# Patient Record
Sex: MALE | Race: WHITE | NOT HISPANIC OR LATINO | Employment: OTHER | URBAN - METROPOLITAN AREA
[De-identification: names, ages, dates, MRNs, and addresses within clinical notes are randomized per-mention and may not be internally consistent; named-entity substitution may affect disease eponyms.]

---

## 2017-02-22 ENCOUNTER — APPOINTMENT (OUTPATIENT)
Dept: LAB | Facility: HOSPITAL | Age: 57
End: 2017-02-22

## 2017-02-22 ENCOUNTER — OFFICE VISIT (OUTPATIENT)
Dept: FAMILY MEDICINE CLINIC | Facility: CLINIC | Age: 57
End: 2017-02-22

## 2017-02-22 VITALS
BODY MASS INDEX: 47.56 KG/M2 | WEIGHT: 303 LBS | TEMPERATURE: 98.9 F | DIASTOLIC BLOOD PRESSURE: 90 MMHG | SYSTOLIC BLOOD PRESSURE: 130 MMHG | OXYGEN SATURATION: 98 % | HEIGHT: 67 IN | HEART RATE: 98 BPM

## 2017-02-22 DIAGNOSIS — I10 ESSENTIAL HYPERTENSION: ICD-10-CM

## 2017-02-22 DIAGNOSIS — Z12.5 SPECIAL SCREENING FOR MALIGNANT NEOPLASM OF PROSTATE: ICD-10-CM

## 2017-02-22 DIAGNOSIS — E03.9 HYPOTHYROIDISM, UNSPECIFIED TYPE: ICD-10-CM

## 2017-02-22 DIAGNOSIS — E11.9 TYPE 2 DIABETES MELLITUS WITHOUT COMPLICATION, WITHOUT LONG-TERM CURRENT USE OF INSULIN (HCC): Primary | ICD-10-CM

## 2017-02-22 LAB
ALBUMIN SERPL-MCNC: 4.9 G/DL (ref 3.2–4.8)
ALBUMIN/GLOB SERPL: 1.6 G/DL (ref 1.5–2.5)
ALP SERPL-CCNC: 67 U/L (ref 25–100)
ALT SERPL W P-5'-P-CCNC: 64 U/L (ref 7–40)
ANION GAP SERPL CALCULATED.3IONS-SCNC: 11 MMOL/L (ref 3–11)
ARTICHOKE IGE QN: 130 MG/DL (ref 0–130)
AST SERPL-CCNC: 36 U/L (ref 0–33)
BILIRUB SERPL-MCNC: 0.3 MG/DL (ref 0.3–1.2)
BUN BLD-MCNC: 9 MG/DL (ref 9–23)
BUN/CREAT SERPL: 11.3 (ref 7–25)
CALCIUM SPEC-SCNC: 10.1 MG/DL (ref 8.7–10.4)
CHLORIDE SERPL-SCNC: 102 MMOL/L (ref 99–109)
CHOLEST SERPL-MCNC: 202 MG/DL (ref 0–200)
CO2 SERPL-SCNC: 26 MMOL/L (ref 20–31)
CREAT BLD-MCNC: 0.8 MG/DL (ref 0.6–1.3)
GFR SERPL CREATININE-BSD FRML MDRD: 100 ML/MIN/1.73
GLOBULIN UR ELPH-MCNC: 3 GM/DL
GLUCOSE BLD-MCNC: 110 MG/DL (ref 70–100)
HBA1C MFR BLD: 6.9 % (ref 4.8–5.6)
HDLC SERPL-MCNC: 45 MG/DL (ref 40–60)
POTASSIUM BLD-SCNC: 4.7 MMOL/L (ref 3.5–5.5)
PROT SERPL-MCNC: 7.9 G/DL (ref 5.7–8.2)
PSA SERPL-MCNC: 0.6 NG/ML (ref 0–4)
SODIUM BLD-SCNC: 139 MMOL/L (ref 132–146)
TRIGL SERPL-MCNC: 325 MG/DL (ref 0–150)
TSH SERPL DL<=0.05 MIU/L-ACNC: 0.71 MIU/ML (ref 0.35–5.35)

## 2017-02-22 PROCEDURE — 83036 HEMOGLOBIN GLYCOSYLATED A1C: CPT | Performed by: PHYSICIAN ASSISTANT

## 2017-02-22 PROCEDURE — 80061 LIPID PANEL: CPT | Performed by: PHYSICIAN ASSISTANT

## 2017-02-22 PROCEDURE — 80053 COMPREHEN METABOLIC PANEL: CPT | Performed by: PHYSICIAN ASSISTANT

## 2017-02-22 PROCEDURE — 82043 UR ALBUMIN QUANTITATIVE: CPT | Performed by: PHYSICIAN ASSISTANT

## 2017-02-22 PROCEDURE — 36415 COLL VENOUS BLD VENIPUNCTURE: CPT | Performed by: PHYSICIAN ASSISTANT

## 2017-02-22 PROCEDURE — 84153 ASSAY OF PSA TOTAL: CPT | Performed by: PHYSICIAN ASSISTANT

## 2017-02-22 PROCEDURE — 84443 ASSAY THYROID STIM HORMONE: CPT | Performed by: PHYSICIAN ASSISTANT

## 2017-02-22 PROCEDURE — 99213 OFFICE O/P EST LOW 20 MIN: CPT | Performed by: PHYSICIAN ASSISTANT

## 2017-02-22 RX ORDER — AMLODIPINE BESYLATE 5 MG/1
5 TABLET ORAL DAILY
Qty: 90 TABLET | Refills: 4 | Status: SHIPPED | OUTPATIENT
Start: 2017-02-22 | End: 2017-05-25 | Stop reason: SDUPTHER

## 2017-02-22 NOTE — PROGRESS NOTES
Subjective   Chandler Mar is a 57 y.o. male    History of Present Illness  Patient is a 57-year-old white male who comes in for follow-up of type 2 diabetes, 6 month follow-up his last A1c was 6.7, he has been trying to lose weight, low carb diet.  States been taking his medication as prescribed.  Comes in follow-up lab work.    Comes in for follow-up of hypertension, blood pressure been running 130/80 doing well no problems or complaints.  No chest pain no shortness of breath. no Chest pain no shortness breath no leg edema    Follow-up on hyporthyroidism,complains no heat and cold intolerance, taking medication as prescribed patient is stable    Patient needs yearly PSA level checked, last PSA was over a year and half ago, no urinary symptoms, no obstruction symptoms        The following portions of the patient's history were reviewed and updated as appropriate: allergies, current medications, past social history and problem list    Review of Systems   Constitutional: Negative for appetite change, diaphoresis, fatigue and unexpected weight change.   Eyes: Negative for visual disturbance.   Respiratory: Negative for cough, chest tightness and shortness of breath.    Cardiovascular: Negative for chest pain, palpitations and leg swelling.   Gastrointestinal: Negative for diarrhea, nausea and vomiting.   Endocrine: Negative for polydipsia, polyphagia and polyuria.   Skin: Negative for color change and rash.   Neurological: Negative for dizziness, syncope, weakness, light-headedness, numbness and headaches.       Objective     Vitals:    02/22/17 1305   BP: 130/90   Pulse: 98   Temp: 98.9 °F (37.2 °C)   SpO2: 98%       Physical Exam   Constitutional: He appears well-developed and well-nourished.   Neck: Neck supple. No JVD present. No thyromegaly present.   Cardiovascular: Normal rate, regular rhythm, normal heart sounds, intact distal pulses and normal pulses.    No murmur heard.  Pulmonary/Chest: Effort normal and  breath sounds normal. No respiratory distress.   Abdominal: Soft. Bowel sounds are normal. There is no hepatosplenomegaly. There is no tenderness.   Musculoskeletal: He exhibits no edema.   Lymphadenopathy:     He has no cervical adenopathy.   Neurological: No sensory deficit.   Skin: Skin is warm and dry. He is not diaphoretic.   Nursing note and vitals reviewed.      Assessment/Plan     Diagnoses and all orders for this visit:    Type 2 diabetes mellitus without complication, without long-term current use of insulin  -     Lipid Panel  -     MicroAlbumin, Urine, Random  -     Comprehensive Metabolic Panel  -     Hemoglobin A1c  -     metFORMIN (GLUCOPHAGE) 500 MG tablet; Take 2 tablets by mouth 2 (Two) Times a Day.    Essential hypertension  -     amLODIPine (NORVASC) 5 MG tablet; Take 1 tablet by mouth Daily.  -     metoprolol tartrate (LOPRESSOR) 25 MG tablet; Take 1 tablet by mouth 3 (Three) Times a Day. Take one half tablet.    Hypothyroidism, unspecified type  -     TSH    Special screening for malignant neoplasm of prostate  -     PSA    follow-up in 6 months

## 2017-02-23 LAB — MICROALBUMIN UR-MCNC: 3.1 UG/ML

## 2017-02-24 DIAGNOSIS — I10 ESSENTIAL HYPERTENSION: ICD-10-CM

## 2017-03-09 DIAGNOSIS — E11.9 TYPE 2 DIABETES MELLITUS WITHOUT COMPLICATION, UNSPECIFIED LONG TERM INSULIN USE STATUS: ICD-10-CM

## 2017-03-09 DIAGNOSIS — E03.9 HYPOTHYROIDISM, UNSPECIFIED TYPE: ICD-10-CM

## 2017-03-09 RX ORDER — LEVOTHYROXINE SODIUM 0.15 MG/1
TABLET ORAL
Qty: 30 TABLET | Refills: 0 | Status: SHIPPED | OUTPATIENT
Start: 2017-03-09 | End: 2017-04-04 | Stop reason: SDUPTHER

## 2017-03-09 RX ORDER — GLIMEPIRIDE 4 MG/1
TABLET ORAL
Qty: 30 TABLET | Refills: 0 | Status: SHIPPED | OUTPATIENT
Start: 2017-03-09 | End: 2017-04-04 | Stop reason: SDUPTHER

## 2017-04-04 DIAGNOSIS — E11.9 TYPE 2 DIABETES MELLITUS WITHOUT COMPLICATION, UNSPECIFIED LONG TERM INSULIN USE STATUS: ICD-10-CM

## 2017-04-04 DIAGNOSIS — E03.9 HYPOTHYROIDISM, UNSPECIFIED TYPE: ICD-10-CM

## 2017-04-04 RX ORDER — LEVOTHYROXINE SODIUM 0.15 MG/1
TABLET ORAL
Qty: 30 TABLET | Refills: 0 | Status: SHIPPED | OUTPATIENT
Start: 2017-04-04 | End: 2017-05-01 | Stop reason: SDUPTHER

## 2017-04-04 RX ORDER — GLIMEPIRIDE 4 MG/1
TABLET ORAL
Qty: 30 TABLET | Refills: 0 | Status: SHIPPED | OUTPATIENT
Start: 2017-04-04 | End: 2017-05-01 | Stop reason: SDUPTHER

## 2017-05-01 DIAGNOSIS — E03.9 HYPOTHYROIDISM, UNSPECIFIED TYPE: ICD-10-CM

## 2017-05-01 DIAGNOSIS — E11.9 TYPE 2 DIABETES MELLITUS WITHOUT COMPLICATION, UNSPECIFIED LONG TERM INSULIN USE STATUS: ICD-10-CM

## 2017-05-01 RX ORDER — LEVOTHYROXINE SODIUM 0.15 MG/1
TABLET ORAL
Qty: 30 TABLET | Refills: 0 | Status: SHIPPED | OUTPATIENT
Start: 2017-05-01 | End: 2017-05-25 | Stop reason: SDUPTHER

## 2017-05-01 RX ORDER — GLIMEPIRIDE 4 MG/1
TABLET ORAL
Qty: 30 TABLET | Refills: 0 | Status: SHIPPED | OUTPATIENT
Start: 2017-05-01 | End: 2017-05-25 | Stop reason: SDUPTHER

## 2017-05-25 ENCOUNTER — OFFICE VISIT (OUTPATIENT)
Dept: FAMILY MEDICINE CLINIC | Facility: CLINIC | Age: 57
End: 2017-05-25

## 2017-05-25 VITALS
DIASTOLIC BLOOD PRESSURE: 94 MMHG | RESPIRATION RATE: 18 BRPM | BODY MASS INDEX: 48.34 KG/M2 | HEIGHT: 67 IN | OXYGEN SATURATION: 97 % | HEART RATE: 93 BPM | SYSTOLIC BLOOD PRESSURE: 128 MMHG | TEMPERATURE: 98.5 F | WEIGHT: 308 LBS

## 2017-05-25 DIAGNOSIS — I10 ESSENTIAL HYPERTENSION: ICD-10-CM

## 2017-05-25 DIAGNOSIS — E03.9 HYPOTHYROIDISM, UNSPECIFIED TYPE: ICD-10-CM

## 2017-05-25 DIAGNOSIS — E11.9 TYPE 2 DIABETES MELLITUS WITHOUT COMPLICATION, WITHOUT LONG-TERM CURRENT USE OF INSULIN (HCC): Primary | ICD-10-CM

## 2017-05-25 DIAGNOSIS — Z12.11 SCREEN FOR COLON CANCER: ICD-10-CM

## 2017-05-25 LAB — HBA1C MFR BLD: 7.2 %

## 2017-05-25 PROCEDURE — 99213 OFFICE O/P EST LOW 20 MIN: CPT | Performed by: PHYSICIAN ASSISTANT

## 2017-05-25 PROCEDURE — 83036 HEMOGLOBIN GLYCOSYLATED A1C: CPT | Performed by: PHYSICIAN ASSISTANT

## 2017-05-25 RX ORDER — LEVOTHYROXINE SODIUM 0.15 MG/1
150 TABLET ORAL DAILY
Qty: 90 TABLET | Refills: 4 | Status: SHIPPED | OUTPATIENT
Start: 2017-05-25 | End: 2018-07-08 | Stop reason: SDUPTHER

## 2017-05-25 RX ORDER — GLIMEPIRIDE 4 MG/1
4 TABLET ORAL
Qty: 90 TABLET | Refills: 0 | Status: SHIPPED | OUTPATIENT
Start: 2017-05-25 | End: 2017-09-18 | Stop reason: SDUPTHER

## 2017-05-25 RX ORDER — POLYMYXIN B SULFATE AND TRIMETHOPRIM 1; 10000 MG/ML; [USP'U]/ML
SOLUTION OPHTHALMIC
COMMUNITY
Start: 2017-05-02

## 2017-05-25 RX ORDER — LATANOPROST 50 UG/ML
SOLUTION/ DROPS OPHTHALMIC
COMMUNITY
Start: 2017-05-20

## 2017-05-25 RX ORDER — AMLODIPINE BESYLATE 5 MG/1
5 TABLET ORAL DAILY
Qty: 90 TABLET | Refills: 4 | Status: SHIPPED | OUTPATIENT
Start: 2017-05-25 | End: 2018-07-08 | Stop reason: SDUPTHER

## 2017-06-28 RX ORDER — BLOOD SUGAR DIAGNOSTIC
STRIP MISCELLANEOUS
Qty: 300 EACH | Refills: 0 | Status: SHIPPED | OUTPATIENT
Start: 2017-06-28 | End: 2017-06-29 | Stop reason: CLARIF

## 2017-06-30 NOTE — TELEPHONE ENCOUNTER
Patient's insurance won't cover glucometer that he had so I called in One Touch Verio which is covered and patient requested.

## 2017-08-25 ENCOUNTER — OFFICE VISIT (OUTPATIENT)
Dept: FAMILY MEDICINE CLINIC | Facility: CLINIC | Age: 57
End: 2017-08-25

## 2017-08-25 VITALS
SYSTOLIC BLOOD PRESSURE: 128 MMHG | BODY MASS INDEX: 47.68 KG/M2 | HEART RATE: 87 BPM | TEMPERATURE: 97.7 F | WEIGHT: 303.8 LBS | OXYGEN SATURATION: 98 % | HEIGHT: 67 IN | RESPIRATION RATE: 16 BRPM | DIASTOLIC BLOOD PRESSURE: 92 MMHG

## 2017-08-25 DIAGNOSIS — E11.9 TYPE 2 DIABETES MELLITUS WITHOUT COMPLICATION, WITHOUT LONG-TERM CURRENT USE OF INSULIN (HCC): Primary | ICD-10-CM

## 2017-08-25 LAB — HBA1C MFR BLD: 7.3 %

## 2017-08-25 PROCEDURE — 99212 OFFICE O/P EST SF 10 MIN: CPT | Performed by: PHYSICIAN ASSISTANT

## 2017-08-25 PROCEDURE — 83036 HEMOGLOBIN GLYCOSYLATED A1C: CPT | Performed by: PHYSICIAN ASSISTANT

## 2017-08-25 NOTE — PROGRESS NOTES
Subjective   Chandler Mar is a 57 y.o. male    History of Present Illness  Age is 57-year-old white male comes in for follow-up of type 2 diabetes patient's A1c today was 7.2 his last A1c 6 months ago was 6.9 patient states that he has been doing better he's had no diabetic neuropathy symptoms he's had no blurry vision no eye symptoms, he has been walking even lost 5 pounds his blood sugar has been running a little higher the last couple of weeks but he is trying to change his diet to improve that.      The following portions of the patient's history were reviewed and updated as appropriate: allergies, current medications, past social history and problem list    Review of Systems   Constitutional: Negative for appetite change, diaphoresis, fatigue and unexpected weight change.   Eyes: Negative for visual disturbance.   Respiratory: Negative for chest tightness and shortness of breath.    Cardiovascular: Negative for chest pain, palpitations and leg swelling.   Gastrointestinal: Negative for diarrhea, nausea and vomiting.   Endocrine: Negative for polydipsia, polyphagia and polyuria.   Skin: Negative for color change.   Neurological: Negative for dizziness, weakness, light-headedness and numbness.       Objective     Vitals:    08/25/17 1258   BP: 128/92   Pulse: 87   Resp: 16   Temp: 97.7 °F (36.5 °C)   SpO2: 98%       Physical Exam   Constitutional: He appears well-developed and well-nourished.   Neck: Neck supple. No JVD present. No thyromegaly present.   Cardiovascular: Normal rate, regular rhythm, normal heart sounds and intact distal pulses.    Pulmonary/Chest: Effort normal and breath sounds normal.   Abdominal: Soft. Bowel sounds are normal.   Musculoskeletal: He exhibits no edema.   Lymphadenopathy:     He has no cervical adenopathy.   Neurological: No sensory deficit.   Skin: Skin is warm and dry. He is not diaphoretic.   Nursing note and vitals reviewed.      Assessment/Plan     Diagnoses and all orders for  this visit:    Type 2 diabetes mellitus without complication, without long-term current use of insulin  -     POC Glycosylated Hemoglobin (Hb A1C)    #1 continue all medication, patient was encouraged to continue with his diet and exercise , I am really encouraged with his weight loss would like for him to try to get under 300 pounds in the next 2 months.

## 2017-09-18 DIAGNOSIS — I10 ESSENTIAL HYPERTENSION: ICD-10-CM

## 2017-09-18 DIAGNOSIS — E11.9 TYPE 2 DIABETES MELLITUS WITHOUT COMPLICATION, WITHOUT LONG-TERM CURRENT USE OF INSULIN (HCC): ICD-10-CM

## 2017-09-18 RX ORDER — GLIMEPIRIDE 4 MG/1
TABLET ORAL
Qty: 90 TABLET | Refills: 0 | Status: SHIPPED | OUTPATIENT
Start: 2017-09-18 | End: 2017-10-27 | Stop reason: SDUPTHER

## 2017-10-03 ENCOUNTER — OUTSIDE FACILITY SERVICE (OUTPATIENT)
Dept: GASTROENTEROLOGY | Facility: CLINIC | Age: 57
End: 2017-10-03

## 2017-10-03 ENCOUNTER — LAB REQUISITION (OUTPATIENT)
Dept: LAB | Facility: HOSPITAL | Age: 57
End: 2017-10-03

## 2017-10-03 DIAGNOSIS — D12.4 BENIGN NEOPLASM OF DESCENDING COLON: ICD-10-CM

## 2017-10-03 DIAGNOSIS — D12.5 BENIGN NEOPLASM OF SIGMOID COLON: ICD-10-CM

## 2017-10-03 DIAGNOSIS — D12.2 BENIGN NEOPLASM OF ASCENDING COLON: ICD-10-CM

## 2017-10-03 DIAGNOSIS — D12.3 BENIGN NEOPLASM OF TRANSVERSE COLON: ICD-10-CM

## 2017-10-03 DIAGNOSIS — Z12.11 ENCOUNTER FOR SCREENING FOR MALIGNANT NEOPLASM OF COLON: ICD-10-CM

## 2017-10-03 PROCEDURE — 45385 COLONOSCOPY W/LESION REMOVAL: CPT | Performed by: INTERNAL MEDICINE

## 2017-10-03 PROCEDURE — 88305 TISSUE EXAM BY PATHOLOGIST: CPT | Performed by: INTERNAL MEDICINE

## 2017-10-06 LAB
CYTO UR: NORMAL
LAB AP CASE REPORT: NORMAL
LAB AP CLINICAL INFORMATION: NORMAL
Lab: NORMAL
PATH REPORT.FINAL DX SPEC: NORMAL
PATH REPORT.GROSS SPEC: NORMAL

## 2017-10-13 ENCOUNTER — TELEPHONE (OUTPATIENT)
Dept: GASTROENTEROLOGY | Facility: CLINIC | Age: 57
End: 2017-10-13

## 2017-10-13 NOTE — TELEPHONE ENCOUNTER
Informed patient of biopsy results per Dr Stoll. Patient voiced understanding that he will have to have repeat colonoscopy in a year.

## 2017-10-13 NOTE — TELEPHONE ENCOUNTER
----- Message from Nayan Stoll MD sent at 10/11/2017  5:17 PM EDT -----  Let Mr. Kerr know he had over 10 adenoma type polyps.  He will need a repeat examination in one year.  Thanks ,  GMANNY

## 2017-10-27 DIAGNOSIS — E11.9 TYPE 2 DIABETES MELLITUS WITHOUT COMPLICATION, WITHOUT LONG-TERM CURRENT USE OF INSULIN (HCC): ICD-10-CM

## 2017-11-02 RX ORDER — GLIMEPIRIDE 4 MG/1
TABLET ORAL
Qty: 90 TABLET | Refills: 0 | Status: SHIPPED | OUTPATIENT
Start: 2017-11-02 | End: 2018-01-14 | Stop reason: SDUPTHER

## 2018-01-02 ENCOUNTER — APPOINTMENT (OUTPATIENT)
Dept: CT IMAGING | Facility: HOSPITAL | Age: 58
End: 2018-01-02

## 2018-01-02 ENCOUNTER — HOSPITAL ENCOUNTER (EMERGENCY)
Facility: HOSPITAL | Age: 58
Discharge: HOME | End: 2018-01-02
Attending: EMERGENCY MEDICINE | Admitting: EMERGENCY MEDICINE

## 2018-01-02 VITALS
HEART RATE: 95 BPM | DIASTOLIC BLOOD PRESSURE: 76 MMHG | TEMPERATURE: 97.8 F | SYSTOLIC BLOOD PRESSURE: 127 MMHG | BODY MASS INDEX: 46.62 KG/M2 | OXYGEN SATURATION: 95 % | RESPIRATION RATE: 18 BRPM | HEIGHT: 67 IN | WEIGHT: 297 LBS

## 2018-01-02 DIAGNOSIS — I10 ESSENTIAL HYPERTENSION: Primary | ICD-10-CM

## 2018-01-02 DIAGNOSIS — R10.9 ABDOMINAL PAIN, LEFT LATERAL: ICD-10-CM

## 2018-01-02 DIAGNOSIS — R73.9 HYPERGLYCEMIA: ICD-10-CM

## 2018-01-02 LAB
ALBUMIN SERPL-MCNC: 4.7 G/DL (ref 3.2–4.8)
ALBUMIN/GLOB SERPL: 1.4 G/DL (ref 1.5–2.5)
ALP SERPL-CCNC: 62 U/L (ref 25–100)
ALT SERPL W P-5'-P-CCNC: 77 U/L (ref 7–40)
ANION GAP SERPL CALCULATED.3IONS-SCNC: 9 MMOL/L (ref 3–11)
AST SERPL-CCNC: 44 U/L (ref 0–33)
BACTERIA UR QL AUTO: NORMAL /HPF
BASOPHILS # BLD AUTO: 0.04 10*3/MM3 (ref 0–0.2)
BASOPHILS NFR BLD AUTO: 0.5 % (ref 0–1)
BILIRUB SERPL-MCNC: 0.6 MG/DL (ref 0.3–1.2)
BILIRUB UR QL STRIP: NEGATIVE
BUN BLD-MCNC: 11 MG/DL (ref 9–23)
BUN/CREAT SERPL: 13.8 (ref 7–25)
CALCIUM SPEC-SCNC: 9.6 MG/DL (ref 8.7–10.4)
CHLORIDE SERPL-SCNC: 103 MMOL/L (ref 99–109)
CLARITY UR: CLEAR
CO2 SERPL-SCNC: 26 MMOL/L (ref 20–31)
COLOR UR: YELLOW
CREAT BLD-MCNC: 0.8 MG/DL (ref 0.6–1.3)
DEPRECATED RDW RBC AUTO: 47 FL (ref 37–54)
EOSINOPHIL # BLD AUTO: 0.12 10*3/MM3 (ref 0–0.3)
EOSINOPHIL NFR BLD AUTO: 1.4 % (ref 0–3)
ERYTHROCYTE [DISTWIDTH] IN BLOOD BY AUTOMATED COUNT: 14.4 % (ref 11.3–14.5)
GFR SERPL CREATININE-BSD FRML MDRD: 100 ML/MIN/1.73
GLOBULIN UR ELPH-MCNC: 3.3 GM/DL
GLUCOSE BLD-MCNC: 231 MG/DL (ref 70–100)
GLUCOSE UR STRIP-MCNC: ABNORMAL MG/DL
HCT VFR BLD AUTO: 43.7 % (ref 38.9–50.9)
HGB BLD-MCNC: 14.4 G/DL (ref 13.1–17.5)
HGB UR QL STRIP.AUTO: NEGATIVE
HOLD SPECIMEN: NORMAL
HOLD SPECIMEN: NORMAL
HYALINE CASTS UR QL AUTO: NORMAL /LPF
IMM GRANULOCYTES # BLD: 0.03 10*3/MM3 (ref 0–0.03)
IMM GRANULOCYTES NFR BLD: 0.4 % (ref 0–0.6)
KETONES UR QL STRIP: ABNORMAL
LEUKOCYTE ESTERASE UR QL STRIP.AUTO: NEGATIVE
LIPASE SERPL-CCNC: 64 U/L (ref 6–51)
LYMPHOCYTES # BLD AUTO: 2.93 10*3/MM3 (ref 0.6–4.8)
LYMPHOCYTES NFR BLD AUTO: 34.3 % (ref 24–44)
MCH RBC QN AUTO: 29.8 PG (ref 27–31)
MCHC RBC AUTO-ENTMCNC: 33 G/DL (ref 32–36)
MCV RBC AUTO: 90.3 FL (ref 80–99)
MONOCYTES # BLD AUTO: 0.68 10*3/MM3 (ref 0–1)
MONOCYTES NFR BLD AUTO: 8 % (ref 0–12)
NEUTROPHILS # BLD AUTO: 4.74 10*3/MM3 (ref 1.5–8.3)
NEUTROPHILS NFR BLD AUTO: 55.4 % (ref 41–71)
NITRITE UR QL STRIP: NEGATIVE
PH UR STRIP.AUTO: 5.5 [PH] (ref 5–8)
PLATELET # BLD AUTO: 247 10*3/MM3 (ref 150–450)
PMV BLD AUTO: 11.5 FL (ref 6–12)
POTASSIUM BLD-SCNC: 4 MMOL/L (ref 3.5–5.5)
PROT SERPL-MCNC: 8 G/DL (ref 5.7–8.2)
PROT UR QL STRIP: ABNORMAL
RBC # BLD AUTO: 4.84 10*6/MM3 (ref 4.2–5.76)
RBC # UR: NORMAL /HPF
REF LAB TEST METHOD: NORMAL
SODIUM BLD-SCNC: 138 MMOL/L (ref 132–146)
SP GR UR STRIP: 1.02 (ref 1–1.03)
SQUAMOUS #/AREA URNS HPF: NORMAL /HPF
TROPONIN I SERPL-MCNC: 0 NG/ML (ref 0–0.07)
UROBILINOGEN UR QL STRIP: ABNORMAL
WBC NRBC COR # BLD: 8.54 10*3/MM3 (ref 3.5–10.8)
WBC UR QL AUTO: NORMAL /HPF
WHOLE BLOOD HOLD SPECIMEN: NORMAL
WHOLE BLOOD HOLD SPECIMEN: NORMAL

## 2018-01-02 PROCEDURE — 85025 COMPLETE CBC W/AUTO DIFF WBC: CPT | Performed by: NURSE PRACTITIONER

## 2018-01-02 PROCEDURE — 93005 ELECTROCARDIOGRAM TRACING: CPT | Performed by: NURSE PRACTITIONER

## 2018-01-02 PROCEDURE — 84484 ASSAY OF TROPONIN QUANT: CPT

## 2018-01-02 PROCEDURE — 81001 URINALYSIS AUTO W/SCOPE: CPT | Performed by: NURSE PRACTITIONER

## 2018-01-02 PROCEDURE — 83690 ASSAY OF LIPASE: CPT | Performed by: NURSE PRACTITIONER

## 2018-01-02 PROCEDURE — 99284 EMERGENCY DEPT VISIT MOD MDM: CPT

## 2018-01-02 PROCEDURE — 80053 COMPREHEN METABOLIC PANEL: CPT | Performed by: NURSE PRACTITIONER

## 2018-01-02 PROCEDURE — 74176 CT ABD & PELVIS W/O CONTRAST: CPT

## 2018-01-02 RX ORDER — SODIUM CHLORIDE 0.9 % (FLUSH) 0.9 %
10 SYRINGE (ML) INJECTION AS NEEDED
Status: DISCONTINUED | OUTPATIENT
Start: 2018-01-02 | End: 2018-01-02 | Stop reason: HOSPADM

## 2018-01-02 NOTE — ED PROVIDER NOTES
Subjective   HPI Comments: Left sided abd pain x 1 week and bp elevated (currently 135/72) reports systolic 200 this am before his bp meds.        Patient is a 57 y.o. male presenting with abdominal pain.   Abdominal Pain   Pain location:  L flank  Pain quality: aching    Pain radiates to:  Does not radiate  Duration:  1 week  Timing:  Constant  Progression:  Waxing and waning  Chronicity:  New  Relieved by:  Nothing  Worsened by:  Nothing  Ineffective treatments:  None tried  Associated symptoms: no diarrhea, no nausea, no shortness of breath and no vomiting        Review of Systems   Respiratory: Negative for shortness of breath.    Gastrointestinal: Positive for abdominal pain. Negative for diarrhea, nausea and vomiting.   All other systems reviewed and are negative.      Past Medical History:   Diagnosis Date   • Broken ankle 2011    Left   • Broken ankle 2015    Right   • Diabetes mellitus    • Hypertension    • Hypothyroidism        Allergies   Allergen Reactions   • Penicillins Hives       Past Surgical History:   Procedure Laterality Date   • INTERVENTIONAL RADIOLOGY PROCEDURE Bilateral 8/18/2016    Procedure: Lumbar Myelogram;  Surgeon: Kali Mcclure MD;  Location: [facility] INVASIVE LOCATION;  Service:    • SPINAL FUSION  2009    neck       Family History   Problem Relation Age of Onset   • Diabetes Mother    • Heart disease Mother      heart trouble   • Heart failure Mother      congestive   • Emphysema Father        Social History     Social History   • Marital status: Single     Spouse name: N/A   • Number of children: N/A   • Years of education: N/A     Social History Main Topics   • Smoking status: Former Smoker   • Smokeless tobacco: Never Used   • Alcohol use No   • Drug use: No   • Sexual activity: Defer     Other Topics Concern   • None     Social History Narrative           Objective   Physical Exam   Constitutional: He is oriented to person, place, and time. He appears well-developed and  well-nourished.   HENT:   Head: Normocephalic and atraumatic.   Right Ear: External ear normal.   Left Ear: External ear normal.   Nose: Nose normal.   Mouth/Throat: Oropharynx is clear and moist.   Eyes: Conjunctivae and EOM are normal. Pupils are equal, round, and reactive to light.   Neck: Normal range of motion. Neck supple.   Cardiovascular: Normal rate, regular rhythm, normal heart sounds and intact distal pulses.    Pulmonary/Chest: Effort normal and breath sounds normal.   Abdominal: Soft. Bowel sounds are normal.   Musculoskeletal: Normal range of motion.   Neurological: He is alert and oriented to person, place, and time.   Skin: Skin is warm and dry.   Psychiatric: He has a normal mood and affect. His behavior is normal. Judgment normal.       Procedures         ED Course  ED Course   Comment By Time   Bp much better without any ED meds. CT neg per rad. Well aware of the ss of worsening condition. All thankful and agreeable. ANTHONY Galarza 01/02 1115                  [facility]    Final diagnoses:   Essential hypertension   Abdominal pain, left lateral   Hyperglycemia            ANTHONY Galarza  01/02/18 1116       ANTHONY Galarza  01/02/18 1117

## 2018-01-05 ENCOUNTER — OFFICE VISIT (OUTPATIENT)
Dept: FAMILY MEDICINE CLINIC | Facility: CLINIC | Age: 58
End: 2018-01-05

## 2018-01-05 VITALS
SYSTOLIC BLOOD PRESSURE: 126 MMHG | RESPIRATION RATE: 18 BRPM | TEMPERATURE: 98.4 F | HEIGHT: 67 IN | WEIGHT: 306.2 LBS | DIASTOLIC BLOOD PRESSURE: 86 MMHG | HEART RATE: 96 BPM | OXYGEN SATURATION: 97 % | BODY MASS INDEX: 48.06 KG/M2

## 2018-01-05 DIAGNOSIS — G47.30 SLEEP APNEA, UNSPECIFIED TYPE: ICD-10-CM

## 2018-01-05 DIAGNOSIS — I10 ESSENTIAL HYPERTENSION: Primary | ICD-10-CM

## 2018-01-05 PROBLEM — E66.01 MORBID OBESITY (HCC): Status: ACTIVE | Noted: 2018-01-05

## 2018-01-05 PROCEDURE — 99213 OFFICE O/P EST LOW 20 MIN: CPT | Performed by: PHYSICIAN ASSISTANT

## 2018-01-05 NOTE — PROGRESS NOTES
Subjective   Chandler Mar is a 58 y.o. male    Hypertension   This is a recurrent problem. The current episode started more than 1 year ago. The problem has been waxing and waning since onset. The problem is controlled. Pertinent negatives include no chest pain, headaches, palpitations or shortness of breath. Risk factors for coronary artery disease include obesity and diabetes mellitus. Past treatments include calcium channel blockers and beta blockers.   Patient states blood pressure has been normal until recently was elevated he was at the ER see note in chart.  Patient also is concerned about possible sleep apnea states he snores loudly at night, has trouble feeling tired throughout the day, no shortness breath no chest pain.  Has daytime somnolence does not feel rested when he gets up at night this been going on for some time.    The following portions of the patient's history were reviewed and updated as appropriate: allergies, current medications, past social history and problem list    Review of Systems   Constitutional: Negative for fatigue and unexpected weight change.   Respiratory: Negative for cough, chest tightness and shortness of breath.    Cardiovascular: Negative for chest pain, palpitations and leg swelling.   Gastrointestinal: Negative for nausea.   Skin: Negative for color change and rash.   Neurological: Negative for dizziness, syncope, weakness and headaches.       Objective     Vitals:    01/05/18 1528   BP: 126/86   Pulse: 96   Resp: 18   Temp: 98.4 °F (36.9 °C)   SpO2: 97%       Physical Exam   Constitutional: He appears well-developed and well-nourished.   Neck: Neck supple. No JVD present. No thyromegaly present.   Cardiovascular: Normal rate, regular rhythm, normal heart sounds, intact distal pulses and normal pulses.    No murmur heard.  Pulmonary/Chest: Effort normal and breath sounds normal. No respiratory distress.   Abdominal: Soft. Bowel sounds are normal. There is no  hepatosplenomegaly. There is no tenderness.   Musculoskeletal: He exhibits no edema.   Lymphadenopathy:     He has no cervical adenopathy.   Neurological: No sensory deficit.   Skin: Skin is warm and dry. He is not diaphoretic.   Nursing note and vitals reviewed.      Assessment/Plan     Diagnoses and all orders for this visit:    Essential hypertension    Sleep apnea, unspecified type  -     Ambulatory Referral to Pulmonology    #1 continue blood pressure medication, patient was encouraged lose weight exercise much possible 3-5 times per week for 30 minutes encouraged to follow low-fat low calorie diet check blood pressure was to 3 times per week    #2 refer to pulmonology for evaluation of sleep apnea

## 2018-01-14 DIAGNOSIS — E11.9 TYPE 2 DIABETES MELLITUS WITHOUT COMPLICATION, WITHOUT LONG-TERM CURRENT USE OF INSULIN (HCC): ICD-10-CM

## 2018-01-15 RX ORDER — GLIMEPIRIDE 4 MG/1
TABLET ORAL
Qty: 90 TABLET | Refills: 0 | Status: SHIPPED | OUTPATIENT
Start: 2018-01-15 | End: 2018-03-09 | Stop reason: SDUPTHER

## 2018-02-26 ENCOUNTER — OFFICE VISIT (OUTPATIENT)
Dept: FAMILY MEDICINE CLINIC | Facility: CLINIC | Age: 58
End: 2018-02-26

## 2018-02-26 VITALS
SYSTOLIC BLOOD PRESSURE: 142 MMHG | TEMPERATURE: 98.7 F | HEART RATE: 93 BPM | WEIGHT: 305.6 LBS | DIASTOLIC BLOOD PRESSURE: 90 MMHG | OXYGEN SATURATION: 95 % | BODY MASS INDEX: 47.97 KG/M2 | HEIGHT: 67 IN

## 2018-02-26 DIAGNOSIS — E11.9 TYPE 2 DIABETES MELLITUS WITHOUT COMPLICATION, WITHOUT LONG-TERM CURRENT USE OF INSULIN (HCC): Primary | ICD-10-CM

## 2018-02-26 LAB — HBA1C MFR BLD: 7 %

## 2018-02-26 PROCEDURE — 83036 HEMOGLOBIN GLYCOSYLATED A1C: CPT | Performed by: PHYSICIAN ASSISTANT

## 2018-02-26 PROCEDURE — 99212 OFFICE O/P EST SF 10 MIN: CPT | Performed by: PHYSICIAN ASSISTANT

## 2018-02-26 NOTE — PROGRESS NOTES
Subjective   Chandler Mar is a 58 y.o. male  Diabetes ( at home. A1C)      History of Present Illness    Chandler is a 58-year-old white male who comes in complaining of diabetes mellitus, type II patient been watching his diet and exercise states that he feels tired no energy feels tired throughout the day.  No short of breath no chest pain.  Meds working well patient's A1c is 7.0.    The following portions of the patient's history were reviewed and updated as appropriate: allergies, current medications, past social history and problem list    Review of Systems   Constitutional: Negative for appetite change, diaphoresis, fatigue and unexpected weight change.   Eyes: Negative for visual disturbance.   Respiratory: Negative for cough, chest tightness and shortness of breath.    Cardiovascular: Negative for chest pain, palpitations and leg swelling.   Gastrointestinal: Negative for diarrhea, nausea and vomiting.   Endocrine: Negative for polydipsia, polyphagia and polyuria.   Skin: Negative for color change and rash.   Neurological: Negative for dizziness, syncope, weakness, light-headedness, numbness and headaches.       Objective     Vitals:    02/26/18 1120   BP: 142/90   Pulse: 93   Temp: 98.7 °F (37.1 °C)   SpO2: 95%       Physical Exam   Constitutional: He appears well-developed and well-nourished.   Neck: No JVD present.   Cardiovascular: Normal rate, regular rhythm, normal heart sounds, intact distal pulses and normal pulses.    No murmur heard.  Pulmonary/Chest: Effort normal and breath sounds normal. No respiratory distress.   Abdominal: Soft. Bowel sounds are normal. There is no hepatosplenomegaly. There is no tenderness.   Musculoskeletal: He exhibits no edema.   Skin: Skin is warm and dry.   Nursing note and vitals reviewed.      Assessment/Plan     Diagnoses and all orders for this visit:    Type 2 diabetes mellitus without complication, without long-term current use of insulin  -     POC  Glycosylated Hemoglobin (Hb A1C)    Continue all meds continue diet and exercise follow up if no better or in 6 months for regular follow-up.

## 2018-03-09 ENCOUNTER — TELEPHONE (OUTPATIENT)
Dept: FAMILY MEDICINE CLINIC | Facility: CLINIC | Age: 58
End: 2018-03-09

## 2018-03-09 DIAGNOSIS — E11.9 TYPE 2 DIABETES MELLITUS WITHOUT COMPLICATION, WITHOUT LONG-TERM CURRENT USE OF INSULIN (HCC): ICD-10-CM

## 2018-03-09 DIAGNOSIS — I10 ESSENTIAL HYPERTENSION: ICD-10-CM

## 2018-03-09 RX ORDER — GLIMEPIRIDE 4 MG/1
4 TABLET ORAL
Qty: 90 TABLET | Refills: 1 | Status: SHIPPED | OUTPATIENT
Start: 2018-03-09 | End: 2018-08-14 | Stop reason: SDUPTHER

## 2018-03-09 NOTE — TELEPHONE ENCOUNTER
----- Message from Rupesh Cleveland sent at 3/9/2018  9:03 AM EST -----  Contact: patient( sincere jeff pt)  Pt needs you to call all prescriptions in to [facility] on [address]. He is tranferring from [facility] on [address]. Pt really meeds metoprolol 25mg and glimeperide 4 mg tabs. He wants all called in and 3 months worth of prescription. Please call pt back [phone]

## 2018-03-09 NOTE — TELEPHONE ENCOUNTER
Meds sent to [facility] and patient notified. I told him that all of his prescriptions are already in [facility]'s system so there shouldn't be a problem getting his medications filled.

## 2018-03-27 ENCOUNTER — HOSPITAL ENCOUNTER (EMERGENCY)
Facility: HOSPITAL | Age: 58
Discharge: HOME | End: 2018-03-27
Attending: EMERGENCY MEDICINE | Admitting: EMERGENCY MEDICINE

## 2018-03-27 VITALS
OXYGEN SATURATION: 93 % | WEIGHT: 290 LBS | DIASTOLIC BLOOD PRESSURE: 82 MMHG | HEIGHT: 67 IN | RESPIRATION RATE: 16 BRPM | TEMPERATURE: 98.4 F | BODY MASS INDEX: 45.52 KG/M2 | HEART RATE: 102 BPM | SYSTOLIC BLOOD PRESSURE: 136 MMHG

## 2018-03-27 DIAGNOSIS — S29.019A STRAIN OF MUSCLE OF TORSO, INITIAL ENCOUNTER: Primary | ICD-10-CM

## 2018-03-27 LAB
ALBUMIN SERPL-MCNC: 4.7 G/DL (ref 3.2–4.8)
ALBUMIN/GLOB SERPL: 1.5 G/DL (ref 1.5–2.5)
ALP SERPL-CCNC: 69 U/L (ref 25–100)
ALT SERPL W P-5'-P-CCNC: 68 U/L (ref 7–40)
ANION GAP SERPL CALCULATED.3IONS-SCNC: 11 MMOL/L (ref 3–11)
AST SERPL-CCNC: 33 U/L (ref 0–33)
BASOPHILS # BLD AUTO: 0.04 10*3/MM3 (ref 0–0.2)
BASOPHILS NFR BLD AUTO: 0.5 % (ref 0–1)
BILIRUB SERPL-MCNC: 0.4 MG/DL (ref 0.3–1.2)
BILIRUB UR QL STRIP: NEGATIVE
BUN BLD-MCNC: 13 MG/DL (ref 9–23)
BUN/CREAT SERPL: 16.3 (ref 7–25)
CALCIUM SPEC-SCNC: 9.8 MG/DL (ref 8.7–10.4)
CHLORIDE SERPL-SCNC: 101 MMOL/L (ref 99–109)
CLARITY UR: CLEAR
CO2 SERPL-SCNC: 24 MMOL/L (ref 20–31)
COLOR UR: YELLOW
CREAT BLD-MCNC: 0.8 MG/DL (ref 0.6–1.3)
DEPRECATED RDW RBC AUTO: 45.5 FL (ref 37–54)
EOSINOPHIL # BLD AUTO: 0.12 10*3/MM3 (ref 0–0.3)
EOSINOPHIL NFR BLD AUTO: 1.5 % (ref 0–3)
ERYTHROCYTE [DISTWIDTH] IN BLOOD BY AUTOMATED COUNT: 13.9 % (ref 11.3–14.5)
GFR SERPL CREATININE-BSD FRML MDRD: 100 ML/MIN/1.73
GLOBULIN UR ELPH-MCNC: 3.2 GM/DL
GLUCOSE BLD-MCNC: 174 MG/DL (ref 70–100)
GLUCOSE UR STRIP-MCNC: ABNORMAL MG/DL
HCT VFR BLD AUTO: 44.8 % (ref 38.9–50.9)
HGB BLD-MCNC: 14.8 G/DL (ref 13.1–17.5)
HGB UR QL STRIP.AUTO: NEGATIVE
HOLD SPECIMEN: NORMAL
HOLD SPECIMEN: NORMAL
IMM GRANULOCYTES # BLD: 0.03 10*3/MM3 (ref 0–0.03)
IMM GRANULOCYTES NFR BLD: 0.4 % (ref 0–0.6)
KETONES UR QL STRIP: NEGATIVE
LEUKOCYTE ESTERASE UR QL STRIP.AUTO: NEGATIVE
LIPASE SERPL-CCNC: 44 U/L (ref 6–51)
LYMPHOCYTES # BLD AUTO: 2.86 10*3/MM3 (ref 0.6–4.8)
LYMPHOCYTES NFR BLD AUTO: 36.2 % (ref 24–44)
MCH RBC QN AUTO: 29.8 PG (ref 27–31)
MCHC RBC AUTO-ENTMCNC: 33 G/DL (ref 32–36)
MCV RBC AUTO: 90.3 FL (ref 80–99)
MONOCYTES # BLD AUTO: 0.59 10*3/MM3 (ref 0–1)
MONOCYTES NFR BLD AUTO: 7.5 % (ref 0–12)
NEUTROPHILS # BLD AUTO: 4.27 10*3/MM3 (ref 1.5–8.3)
NEUTROPHILS NFR BLD AUTO: 53.9 % (ref 41–71)
NITRITE UR QL STRIP: NEGATIVE
PH UR STRIP.AUTO: 6.5 [PH] (ref 5–8)
PLATELET # BLD AUTO: 243 10*3/MM3 (ref 150–450)
PMV BLD AUTO: 11.3 FL (ref 6–12)
POTASSIUM BLD-SCNC: 4.4 MMOL/L (ref 3.5–5.5)
PROT SERPL-MCNC: 7.9 G/DL (ref 5.7–8.2)
PROT UR QL STRIP: ABNORMAL
RBC # BLD AUTO: 4.96 10*6/MM3 (ref 4.2–5.76)
SODIUM BLD-SCNC: 136 MMOL/L (ref 132–146)
SP GR UR STRIP: 1.02 (ref 1–1.03)
UROBILINOGEN UR QL STRIP: ABNORMAL
WBC NRBC COR # BLD: 7.91 10*3/MM3 (ref 3.5–10.8)
WHOLE BLOOD HOLD SPECIMEN: NORMAL
WHOLE BLOOD HOLD SPECIMEN: NORMAL

## 2018-03-27 PROCEDURE — 83690 ASSAY OF LIPASE: CPT

## 2018-03-27 PROCEDURE — 99283 EMERGENCY DEPT VISIT LOW MDM: CPT

## 2018-03-27 PROCEDURE — 85025 COMPLETE CBC W/AUTO DIFF WBC: CPT

## 2018-03-27 PROCEDURE — 80053 COMPREHEN METABOLIC PANEL: CPT

## 2018-03-27 PROCEDURE — 81003 URINALYSIS AUTO W/O SCOPE: CPT

## 2018-03-27 RX ORDER — SODIUM CHLORIDE 0.9 % (FLUSH) 0.9 %
10 SYRINGE (ML) INJECTION AS NEEDED
Status: DISCONTINUED | OUTPATIENT
Start: 2018-03-27 | End: 2018-03-27 | Stop reason: HOSPADM

## 2018-03-27 NOTE — DISCHARGE INSTRUCTIONS
Follow up with Lucius Josue as soon as possible. Call to make an appointment.     Increase physical activity and utilize heat therapy as advised to reduce the pain    Immediately return to the emergency department for any new or worsening symptoms.

## 2018-03-27 NOTE — ED PROVIDER NOTES
Subjective   Mr. Chandler Mar is a 58 y.o. male who presents to the ED with c/o left flank pain onset about 3 days ago. Pt reports he was in this ED 2 months ago for the same sx and since discharge he has experienced waxing and waning pain that has worsened in the past 3 days. The pain is localized to the left flank with an associated sore sensation. He states since the worsening pain, he has been having difficulty walking and the pain is exacerbated with movement, standing, and position changes. He states the pain is not producible with palpation, but it is constant. He also complains of bloating and increased urination however he denies any other sx at this time. CT scans were not repeated at this visit due to previous scans being so recent with no abnormal results. He notes he is not active at baseline. Pt followed up with PCP post ED discharge in 2 months ago however he denies any follow-up with physical therapist. He has hx of DM, HTN, and hypothyroidism and a surgical hx of spinal fusion.           History provided by:  Patient  Flank Pain   Pain location:  L flank  Pain quality comment:  Soreness  Pain radiates to:  Does not radiate  Pain severity:  Moderate  Onset quality:  Sudden  Duration:  3 days  Timing:  Constant  Progression:  Worsening  Chronicity:  Recurrent  Relieved by:  None tried  Worsened by:  Movement and position changes  Ineffective treatments:  None tried      Review of Systems   Genitourinary: Positive for flank pain (left flank pain).   All other systems reviewed and are negative.      Past Medical History:   Diagnosis Date   • Broken ankle 2011    Left   • Broken ankle 2015    Right   • Diabetes mellitus    • Hypertension    • Hypothyroidism        Allergies   Allergen Reactions   • Penicillins Hives       Past Surgical History:   Procedure Laterality Date   • INTERVENTIONAL RADIOLOGY PROCEDURE Bilateral 8/18/2016    Procedure: Lumbar Myelogram;  Surgeon: Kali Mcclure MD;  Location:   [facility] INVAS[facility] LOCATION;  Service:    • SPINAL FUSION  2009    neck       Family History   Problem Relation Age of Onset   • Diabetes Mother    • Heart disease Mother      heart trouble   • Heart failure Mother      congestive   • Emphysema Father        Social History     Social History   • Marital status: Single     Social History Main Topics   • Smoking status: Former Smoker   • Smokeless tobacco: Never Used   • Alcohol use No   • Drug use: No   • Sexual activity: Defer     Other Topics Concern   • Not on file         Objective   Physical Exam   Constitutional: He is oriented to person, place, and time. He appears well-developed and well-nourished. No distress.   HENT:   Head: Normocephalic and atraumatic.    Airway patent.    Eyes: Conjunctivae are normal.   Neck: Normal range of motion.   Pulmonary/Chest: Effort normal.   Abdominal: Soft. Bowel sounds are normal. There is no tenderness.   Musculoskeletal: Normal range of motion. He exhibits no tenderness.   No spinal tenderness or tenderness to palpation of L flank, bilateral ribs, or buttocks.    Neurological: He is alert and oriented to person, place, and time.   Skin: Skin is warm and dry.   Psychiatric: He has a normal mood and affect. His behavior is normal. Judgment and thought content normal.   Nursing note and vitals reviewed.      Procedures         ED Course  ED Course   Value Comment By Time   Temp: 98.4 °F (36.9 °C) (Reviewed) Rashaun Latanya Garnica 03/27 1811    He is not wanting any pain medications or muscle relaxers. Martinez Salazar MD 03/27 1827     Recent Results (from the past 24 hour(s))   Comprehensive Metabolic Panel    Collection Time: 03/27/18  1:36 PM   Result Value Ref Range    Glucose 174 (H) 70 - 100 mg/dL    BUN 13 9 - 23 mg/dL    Creatinine 0.80 0.60 - 1.30 mg/dL    Sodium 136 132 - 146 mmol/L    Potassium 4.4 3.5 - 5.5 mmol/L    Chloride 101 99 - 109 mmol/L    CO2 24.0 20.0 - 31.0 mmol/L    Calcium 9.8 8.7 - 10.4 mg/dL     Total Protein 7.9 5.7 - 8.2 g/dL    Albumin 4.70 3.20 - 4.80 g/dL    ALT (SGPT) 68 (H) 7 - 40 U/L    AST (SGOT) 33 0 - 33 U/L    Alkaline Phosphatase 69 25 - 100 U/L    Total Bilirubin 0.4 0.3 - 1.2 mg/dL    eGFR Non African Amer 100 >60 mL/min/1.73    Globulin 3.2 gm/dL    A/G Ratio 1.5 1.5 - 2.5 g/dL    BUN/Creatinine Ratio 16.3 7.0 - 25.0    Anion Gap 11.0 3.0 - 11.0 mmol/L   Lipase    Collection Time: 03/27/18  1:36 PM   Result Value Ref Range    Lipase 44 6 - 51 U/L   Light Blue Top    Collection Time: 03/27/18  1:36 PM   Result Value Ref Range    Extra Tube hold for add-on    Green Top (Gel)    Collection Time: 03/27/18  1:36 PM   Result Value Ref Range    Extra Tube Hold for add-ons.    Lavender Top    Collection Time: 03/27/18  1:36 PM   Result Value Ref Range    Extra Tube hold for add-on    Gold Top - SST    Collection Time: 03/27/18  1:36 PM   Result Value Ref Range    Extra Tube Hold for add-ons.    CBC Auto Differential    Collection Time: 03/27/18  1:36 PM   Result Value Ref Range    WBC 7.91 3.50 - 10.80 10*3/mm3    RBC 4.96 4.20 - 5.76 10*6/mm3    Hemoglobin 14.8 13.1 - 17.5 g/dL    Hematocrit 44.8 38.9 - 50.9 %    MCV 90.3 80.0 - 99.0 fL    MCH 29.8 27.0 - 31.0 pg    MCHC 33.0 32.0 - 36.0 g/dL    RDW 13.9 11.3 - 14.5 %    RDW-SD 45.5 37.0 - 54.0 fl    MPV 11.3 6.0 - 12.0 fL    Platelets 243 150 - 450 10*3/mm3    Neutrophil % 53.9 41.0 - 71.0 %    Lymphocyte % 36.2 24.0 - 44.0 %    Monocyte % 7.5 0.0 - 12.0 %    Eosinophil % 1.5 0.0 - 3.0 %    Basophil % 0.5 0.0 - 1.0 %    Immature Grans % 0.4 0.0 - 0.6 %    Neutrophils, Absolute 4.27 1.50 - 8.30 10*3/mm3    Lymphocytes, Absolute 2.86 0.60 - 4.80 10*3/mm3    Monocytes, Absolute 0.59 0.00 - 1.00 10*3/mm3    Eosinophils, Absolute 0.12 0.00 - 0.30 10*3/mm3    Basophils, Absolute 0.04 0.00 - 0.20 10*3/mm3    Immature Grans, Absolute 0.03 0.00 - 0.03 10*3/mm3   Urinalysis With / Culture If Indicated - Urine, Clean Catch    Collection Time: 03/27/18  1:49  "PM   Result Value Ref Range    Color, UA Yellow Yellow, Straw    Appearance, UA Clear Clear    pH, UA 6.5 5.0 - 8.0    Specific Gravity, UA 1.017 1.001 - 1.030    Glucose,  mg/dL (1+) (A) Negative    Ketones, UA Negative Negative    Bilirubin, UA Negative Negative    Blood, UA Negative Negative    Protein, UA Trace (A) Negative    Leuk Esterase, UA Negative Negative    Nitrite, UA Negative Negative    Urobilinogen, UA 1.0 E.U./dL 0.2 - 1.0 E.U./dL     Note: In addition to lab results from this visit, the labs listed above may include labs taken at another facility or during a different encounter within the last 24 hours. Please correlate lab times with ED admission and discharge times for further clarification of the services performed during this visit.    No orders to display     Vitals:    03/27/18 1315 03/27/18 1742   BP: (!) 183/98 151/92   BP Location: Left arm Left arm   Patient Position: Sitting Sitting   Pulse: 102 102   Resp: 20 16   Temp: 98.4 °F (36.9 °C)    TempSrc: Oral    SpO2: 95% 96%   Weight: 132 kg (290 lb)    Height: 170.2 cm (67\")      Medications   sodium chloride 0.9 % flush 10 mL (not administered)     ECG/EMG Results (last 24 hours)     ** No results found for the last 24 hours. **                        [facility]    Final diagnoses:   Strain of muscle of torso, initial encounter       Documentation assistance provided by mark Garnica.  Information recorded by the scribe was done at my direction and has been verified and validated by me.     Rashaun Garnica  03/27/18 1822       Martinez Salazar MD  03/27/18 2230    "

## 2018-04-12 ENCOUNTER — CONSULT (OUTPATIENT)
Dept: SLEEP MEDICINE | Facility: HOSPITAL | Age: 58
End: 2018-04-12

## 2018-04-12 VITALS
BODY MASS INDEX: 48.18 KG/M2 | OXYGEN SATURATION: 94 % | SYSTOLIC BLOOD PRESSURE: 148 MMHG | HEIGHT: 67 IN | HEART RATE: 96 BPM | DIASTOLIC BLOOD PRESSURE: 80 MMHG | WEIGHT: 307 LBS

## 2018-04-12 DIAGNOSIS — E66.01 MORBID OBESITY (HCC): ICD-10-CM

## 2018-04-12 DIAGNOSIS — G47.33 OBSTRUCTIVE SLEEP APNEA, ADULT: ICD-10-CM

## 2018-04-12 DIAGNOSIS — R06.83 SNORING: Primary | ICD-10-CM

## 2018-04-12 PROCEDURE — 99203 OFFICE O/P NEW LOW 30 MIN: CPT | Performed by: INTERNAL MEDICINE

## 2018-04-13 NOTE — PROGRESS NOTES
Subjective   Chandler Mar is a 58 y.o. male is being seen for consultation today at the request of Codey Josue, *PAC for the evaluation of snoring and nonrestorative sleep.    History of Present Illness  Patient states had a history of snoring noted for at least 7 years.  He says he's had apneas noted about that long as well.  He has awakened himself snoring.  He is lost about 12 pounds in recent years.  He says he's often rested in the morning.  He denies having morning headache.  He had a history of occasional sleep paralysis as a child but without recently.  He will fall asleep if sitting quietly during the day.  He denies any problems while driving.    He has history of loud snoring and snores in all positions.  He has waking with a dry mouth and gasping.  He denies ever breaking his nose.  He denies any reflux symptoms.  He denies hypnagogic hallucinations.  He denies any cataplexy.  He denies kicking or jerking his legs at night.  He does have some chronic back pain bothersome at night.  He thinks he's lost 9 pounds in the past year.    He goes to bed about 9 PM.  He'll fall asleep within 10 or 15 minutes.  He awakens once during the night he thinks he gets about 9 hours of sleep says he generally feels rested.  He has a history of hypertension known for 20 years.  He is had diabetes known for about 10 years.  He denies any history coronary artery disease.  He has a history of thyroid problems for about 10 years.  Allergies   Allergen Reactions   • Penicillins Hives          Current Outpatient Prescriptions:   •  ACCU-CHEK SOFTCLIX LANCETS lancets, 1 each., Disp: , Rfl:   •  amLODIPine (NORVASC) 5 MG tablet, Take 1 tablet by mouth Daily., Disp: 90 tablet, Rfl: 4  •  glimepiride (AMARYL) 4 MG tablet, Take 1 tablet by mouth Every Morning Before Breakfast., Disp: 90 tablet, Rfl: 1  •  glucose blood test strip, Use as instructed, Disp: 100 each, Rfl: 12  •  glucose blood test strip, Test blood sugar  three times daily, Disp: 100 each, Rfl: 12  •  latanoprost (XALATAN) 0.005 % ophthalmic solution, , Disp: , Rfl:   •  levothyroxine (SYNTHROID, LEVOTHROID) 150 MCG tablet, Take 1 tablet by mouth Daily., Disp: 90 tablet, Rfl: 4  •  metFORMIN (GLUCOPHAGE) 1000 MG tablet, Take 1 tablet by mouth 2 (Two) Times a Day With Meals., Disp: 180 tablet, Rfl: 3  •  metoprolol tartrate (LOPRESSOR) 25 MG tablet, Take one tablet twice daily. May break in half if needed., Disp: 90 tablet, Rfl: 1  •  Multiple Vitamins-Minerals (MULTIVITAMIN ADULT PO), Take  by mouth., Disp: , Rfl:   •  Omega-3 Fatty Acids (OMEGA 3 PO), Take  by mouth., Disp: , Rfl:   •  trimethoprim-polymyxin b (POLYTRIM) 04949-2.1 UNIT/ML-% ophthalmic solution, , Disp: , Rfl:     Smoking status: Former Smoker                                                              Packs/day: 2.00      Years: 24.00    he quit smoking in 2000.  Smokeless tobacco: Never Used                           History   Alcohol Use No       Caffeine: He drinks about 8 cups coffee per day    Past Medical History:   Diagnosis Date   • Broken ankle 2011    Left   • Broken ankle 2015    Right   • Diabetes mellitus    • Hypertension    • Hypothyroidism        Past Surgical History:   Procedure Laterality Date   • INTERVENTIONAL RADIOLOGY PROCEDURE Bilateral 8/18/2016    Procedure: Lumbar Myelogram;  Surgeon: Kali Mcclure MD;  Location: [facility] INVASIVE LOCATION;  Service:    • SPINAL FUSION  2009    neck   He's had surgery on his ankles twice.      Family History   Problem Relation Age of Onset   • Diabetes Mother    • Heart disease Mother      heart trouble   • Heart failure Mother      congestive   • Emphysema Father    Family history is also positive for hypertension, obesity, sleep apnea, COPD, and thyroid problems.    The following portions of the patient's history were reviewed and updated as appropriate: allergies, current medications, past family history, past medical history,  "past social history, past surgical history and problem list.    Review of Systems   Constitutional: Negative.    HENT: Positive for hearing loss and tinnitus.    Eyes: Positive for visual disturbance.   Respiratory: Negative.    Cardiovascular: Positive for leg swelling.   Gastrointestinal: Negative.    Endocrine: Positive for cold intolerance and polydipsia.   Genitourinary: Negative.    Musculoskeletal: Positive for back pain and gait problem.   Skin: Negative.    Allergic/Immunologic: Negative.    Neurological: Positive for numbness.   Hematological: Negative.    Psychiatric/Behavioral: Negative.     [place] scores 11/24    Objective     /80   Pulse 96   Ht 170.2 cm (67.01\")   Wt (!) 139 kg (307 lb)   SpO2 94%   BMI 48.07 kg/m²      Physical Exam   Constitutional: He appears well-developed and well-nourished.   He is morbidly obese.   HENT:   Head: Normocephalic and atraumatic.   His nasal airway narrowing and mallampati class IV anatomy   Eyes: Pupils are equal, round, and reactive to light.   Neck: Normal range of motion. Neck supple.   Cardiovascular: Normal rate, regular rhythm and normal heart sounds.    Pulmonary/Chest: Effort normal and breath sounds normal.   Abdominal: Soft. Bowel sounds are normal.   Musculoskeletal: Normal range of motion. He exhibits edema.   He has trace pedal edema   Neurological: He is alert.   Skin: Skin is warm and dry.   Psychiatric: He has a normal mood and affect. His behavior is normal.         Assessment/Plan   Chandler was seen today for sleeping problem.    Diagnoses and all orders for this visit:    Snoring  -     Home Sleep Study; Future    Obstructive sleep apnea, adult  -     Home Sleep Study; Future    Morbid obesity     patient is history of snoring and nonrestorative sleep.  I think his next story for obstructive sleep apnea.  We will plan to proceed to home sleep testing.  I've discussed potential therapies including CPAP, weight control, oral appliances, " and surgery.  Also discussed possible complications of long-term untreated obstructive sleep apnea.  He is to return then after his study.  We will reassess him at that time.  He is encouraged to lose weight.  He is encouraged to avoid alcohol and sedatives close to bedtime.  He is encouraged practice lateral position sleep.         Milan Campbell MD [facility]  Sleep Medicine  Pulmonary and Critical Care Medicine

## 2018-05-02 ENCOUNTER — HOSPITAL ENCOUNTER (OUTPATIENT)
Dept: SLEEP MEDICINE | Facility: HOSPITAL | Age: 58
Discharge: HOME | End: 2018-05-02
Attending: INTERNAL MEDICINE | Admitting: INTERNAL MEDICINE

## 2018-05-02 VITALS
HEIGHT: 67 IN | OXYGEN SATURATION: 91 % | BODY MASS INDEX: 47.47 KG/M2 | DIASTOLIC BLOOD PRESSURE: 71 MMHG | SYSTOLIC BLOOD PRESSURE: 141 MMHG | HEART RATE: 109 BPM | WEIGHT: 302.47 LBS

## 2018-05-02 DIAGNOSIS — G47.33 OBSTRUCTIVE SLEEP APNEA, ADULT: ICD-10-CM

## 2018-05-02 DIAGNOSIS — R06.83 SNORING: ICD-10-CM

## 2018-05-02 PROCEDURE — 95811 POLYSOM 6/>YRS CPAP 4/> PARM: CPT | Performed by: INTERNAL MEDICINE

## 2018-05-02 PROCEDURE — 95811 POLYSOM 6/>YRS CPAP 4/> PARM: CPT

## 2018-05-03 DIAGNOSIS — G47.33 SEVERE OBSTRUCTIVE SLEEP APNEA: Primary | ICD-10-CM

## 2018-05-08 NOTE — PROGRESS NOTES
Patient was very agreeable to being set up on pap therapy. Will be using [employer] as his dme company 5/8/2018 Complete fax

## 2018-06-12 ENCOUNTER — TELEPHONE (OUTPATIENT)
Dept: FAMILY MEDICINE CLINIC | Facility: CLINIC | Age: 58
End: 2018-06-12

## 2018-06-12 NOTE — TELEPHONE ENCOUNTER
Patient BP sometimes runs 110's over 50's. He is concerned that with the BP meds he is currently on and the CPAP machine is making his BP run too low. Please advise.

## 2018-06-12 NOTE — TELEPHONE ENCOUNTER
----- Message from Jasmine Bolaños sent at 6/12/2018  2:02 PM EDT -----  Contact: PT  WOULD LIKE TO SPEAK WITH NURSE/MA RE: CONCERNS ABOUT BLOOD PRESSURE, WOULD LIKE TO KNOW WHAT IS CONSIDERED LOW. PT CAN BE REACHED -[phone]

## 2018-06-14 ENCOUNTER — TELEPHONE (OUTPATIENT)
Dept: FAMILY MEDICINE CLINIC | Facility: CLINIC | Age: 58
End: 2018-06-14

## 2018-06-14 DIAGNOSIS — I10 ESSENTIAL HYPERTENSION: ICD-10-CM

## 2018-06-14 NOTE — TELEPHONE ENCOUNTER
----- Message from Katie Gorman sent at 6/14/2018 11:01 AM EDT -----  Contact: PT.  PT. SEE'S ONUR.  PT. IS NEEDING A REFILL ON HIS: metoprolol tartrate (LOPRESSOR) 25 MG tablet 90 tablet 1 3/9/2018    Sig: Take one tablet twice daily. May break in half if needed     NEEDING A #90 DAY SUPPLY.  RX=[facility] [address] [place] [address] - [phone]  - [phone]  [phone] (Phone)  [phone] (Fax)    PT. CAN BE REACHED @ [facility] #.

## 2018-07-08 DIAGNOSIS — I10 ESSENTIAL HYPERTENSION: ICD-10-CM

## 2018-07-08 DIAGNOSIS — E03.9 HYPOTHYROIDISM, UNSPECIFIED TYPE: ICD-10-CM

## 2018-07-09 RX ORDER — LEVOTHYROXINE SODIUM 0.15 MG/1
TABLET ORAL
Qty: 90 TABLET | Refills: 3 | Status: SHIPPED | OUTPATIENT
Start: 2018-07-09 | End: 2019-08-22 | Stop reason: SDUPTHER

## 2018-07-09 RX ORDER — AMLODIPINE BESYLATE 5 MG/1
TABLET ORAL
Qty: 90 TABLET | Refills: 3 | Status: SHIPPED | OUTPATIENT
Start: 2018-07-09 | End: 2018-08-27 | Stop reason: ALTCHOICE

## 2018-08-10 ENCOUNTER — OFFICE VISIT (OUTPATIENT)
Dept: SLEEP MEDICINE | Facility: HOSPITAL | Age: 58
End: 2018-08-10

## 2018-08-10 VITALS
HEIGHT: 67 IN | DIASTOLIC BLOOD PRESSURE: 83 MMHG | HEART RATE: 85 BPM | WEIGHT: 311.2 LBS | OXYGEN SATURATION: 91 % | BODY MASS INDEX: 48.84 KG/M2 | SYSTOLIC BLOOD PRESSURE: 159 MMHG

## 2018-08-10 DIAGNOSIS — G47.34 NOCTURNAL HYPOXEMIA: ICD-10-CM

## 2018-08-10 DIAGNOSIS — G47.33 OSA (OBSTRUCTIVE SLEEP APNEA): Primary | ICD-10-CM

## 2018-08-10 DIAGNOSIS — E66.01 MORBID OBESITY (HCC): ICD-10-CM

## 2018-08-10 DIAGNOSIS — G47.33 SEVERE OBSTRUCTIVE SLEEP APNEA: ICD-10-CM

## 2018-08-10 PROCEDURE — 99214 OFFICE O/P EST MOD 30 MIN: CPT | Performed by: INTERNAL MEDICINE

## 2018-08-10 NOTE — PROGRESS NOTES
Subjective   Chandler Mar is a 58 y.o. male is here today for follow-up.  He is seen follow-up here with snoring and nonrestorative sleep.  His referring provider is Lucius PEARSON    History of Present Illness  Patient was seen here April 12 of snoring and nonrestorative sleep.  He also has a history of diabetes hypertension and morbid obesity.  We originally plan to do the home study but he was unable to accomplish that.  He was changed to a polysomnogram and had that on May 3.  He thought his study night was not quite as good as usual he was bothered by the wires.  He is now on CPAP and says he's doing well with that using it every night.  He feels better on the CPAP and feels more rested.  He says his blood pressure control is also improved.  Past Medical History:   Diagnosis Date   • Broken ankle 2011    Left   • Broken ankle 2015    Right   • Diabetes mellitus (CMS/HCC)    • Hypertension    • Hypothyroidism        Past Surgical History:   Procedure Laterality Date   • INTERVENTIONAL RADIOLOGY PROCEDURE Bilateral 8/18/2016    Procedure: Lumbar Myelogram;  Surgeon: Kali Mcclure MD;  Location: [facility] INVASIVE LOCATION;  Service:    • SPINAL FUSION  2009    neck           Current Outpatient Prescriptions:   •  ACCU-CHEK SOFTCLIX LANCETS lancets, 1 each., Disp: , Rfl:   •  glimepiride (AMARYL) 4 MG tablet, Take 1 tablet by mouth Every Morning Before Breakfast., Disp: 90 tablet, Rfl: 1  •  glucose blood test strip, Use as instructed, Disp: 100 each, Rfl: 12  •  glucose blood test strip, Test blood sugar three times daily, Disp: 100 each, Rfl: 12  •  latanoprost (XALATAN) 0.005 % ophthalmic solution, , Disp: , Rfl:   •  levothyroxine (SYNTHROID, LEVOTHROID) 150 MCG tablet, TAKE ONE TABLET BY MOUTH ONCE DAILY, Disp: 90 tablet, Rfl: 3  •  metFORMIN (GLUCOPHAGE) 1000 MG tablet, Take 1 tablet by mouth 2 (Two) Times a Day With Meals., Disp: 180 tablet, Rfl: 3  •  metoprolol tartrate (LOPRESSOR) 25 MG tablet,  "TAKE 1 TABLET BY MOUTH TWICE DAILY MAY  BREAK  IN  HALF  IF  NEEDED, Disp: 90 tablet, Rfl: 1  •  Multiple Vitamins-Minerals (MULTIVITAMIN ADULT PO), Take  by mouth., Disp: , Rfl:   •  Omega-3 Fatty Acids (OMEGA 3 PO), Take  by mouth., Disp: , Rfl:   •  probiotic (CULTURELLE) capsule capsule, Take 1 capsule by mouth Daily., Disp: , Rfl:   •  trimethoprim-polymyxin b (POLYTRIM) 99721-6.1 UNIT/ML-% ophthalmic solution, , Disp: , Rfl:   •  amLODIPine (NORVASC) 5 MG tablet, TAKE 1 TABLET BY MOUTH ONCE DAILY, Disp: 90 tablet, Rfl: 3    Allergies   Allergen Reactions   • Penicillins Rash     Itchy red bumps         The following portions of the patient's history were reviewed and updated as appropriate: allergies, current medications and problem list.    Review of Systems   Constitutional: Negative.    HENT: Positive for congestion, postnasal drip and sinus pressure.    Eyes: Positive for visual disturbance.   Respiratory: Negative.    Cardiovascular: Negative.    Gastrointestinal: Positive for constipation and diarrhea.   Endocrine: Positive for cold intolerance, heat intolerance, polydipsia and polyuria.   Genitourinary: Negative.    Musculoskeletal: Negative.    Skin: Negative.    Allergic/Immunologic: Negative.    Neurological: Negative.    Hematological: Negative.    Psychiatric/Behavioral: Negative.    [place] scores 8/24    Objective     /83   Pulse 85   Ht 170.2 cm (67\")   Wt (!) 141 kg (311 lb 3.2 oz)   SpO2 91%   BMI 48.74 kg/m²     Physical Exam   Constitutional: He is oriented to person, place, and time. He appears well-developed and well-nourished.   He is morbidly obese.   HENT:   Head: Normocephalic and atraumatic.   He has Mallampati class for anatomy with mild retrognathia.   Eyes: Pupils are equal, round, and reactive to light. EOM are normal.   Neck: Normal range of motion. Neck supple.   Cardiovascular: Normal rate, regular rhythm and normal heart sounds.    Pulmonary/Chest: Effort normal and " breath sounds normal.   Abdominal: Soft. Bowel sounds are normal.   Musculoskeletal: Normal range of motion. He exhibits edema.   Has trace pedal edema   Neurological: He is alert and oriented to person, place, and time.   Skin: Skin is warm and dry.   Psychiatric: He has a normal mood and affect. His behavior is normal.    polysomnogram on May 3 showed an AHI of 123.3.  He had oxygen desaturations to 75% and spent 30.7% of his total sleep time desaturated state.  He titrated to a CPAP of 14.    Download from his machine shows he's had it 91 days and used it 97.8% the time.  He's averaging 8 hours 52 minutes per day.  His 90% tile pressure is 14.9.  His AHI 0.8.  He has a pressure setting of 14 cm minimum and 20 cm maximum      Assessment/Plan   Chandler was seen today for follow-up.    Diagnoses and all orders for this visit:    LUISANA (obstructive sleep apnea)  -     CPAP Therapy  -     Overnight Sleep Oximetry Study; Future    Severe obstructive sleep apnea    Nocturnal hypoxemia  -     Overnight Sleep Oximetry Study; Future    Morbid obesity (CMS/HCC)    Patient has severe obstructive sleep apnea but has excellent control of his respiratory events on his current pressure settings.  We will continue on these pressures.  We will renew his supplies.  We will check an overnight oximetry to make certain were correcting nocturnal hypoxemia.  Assuming it is adequate we'll plan to see him back in 1 year.  He is encouraged to lose weight.  He declines referral to a dietitian.  He is encouraged to avoid alcohol and sedatives close to bedtime.  He is encouraged practice lateral position sleep.  He is to contact us earlier symptoms worsen.             Milan Campbell MD [facility]  Sleep Medicine  Pulmonary and Critical Care Medicine      08/10/18  10:59 AM

## 2018-08-14 DIAGNOSIS — I10 ESSENTIAL HYPERTENSION: ICD-10-CM

## 2018-08-14 DIAGNOSIS — E11.9 TYPE 2 DIABETES MELLITUS WITHOUT COMPLICATION, WITHOUT LONG-TERM CURRENT USE OF INSULIN (HCC): ICD-10-CM

## 2018-08-17 RX ORDER — GLIMEPIRIDE 4 MG/1
4 TABLET ORAL
Qty: 90 TABLET | Refills: 1 | Status: SHIPPED | OUTPATIENT
Start: 2018-08-17 | End: 2019-02-28 | Stop reason: SDUPTHER

## 2018-08-27 ENCOUNTER — OFFICE VISIT (OUTPATIENT)
Dept: FAMILY MEDICINE CLINIC | Facility: CLINIC | Age: 58
End: 2018-08-27

## 2018-08-27 ENCOUNTER — LAB (OUTPATIENT)
Dept: LAB | Facility: HOSPITAL | Age: 58
End: 2018-08-27

## 2018-08-27 VITALS
TEMPERATURE: 98.1 F | HEART RATE: 88 BPM | BODY MASS INDEX: 48.15 KG/M2 | DIASTOLIC BLOOD PRESSURE: 94 MMHG | RESPIRATION RATE: 14 BRPM | OXYGEN SATURATION: 98 % | SYSTOLIC BLOOD PRESSURE: 146 MMHG | WEIGHT: 306.8 LBS | HEIGHT: 67 IN

## 2018-08-27 DIAGNOSIS — Z00.00 ROUTINE GENERAL MEDICAL EXAMINATION AT A HEALTH CARE FACILITY: ICD-10-CM

## 2018-08-27 DIAGNOSIS — Z12.5 SPECIAL SCREENING FOR MALIGNANT NEOPLASM OF PROSTATE: ICD-10-CM

## 2018-08-27 DIAGNOSIS — Z00.00 WELCOME TO MEDICARE PREVENTIVE VISIT: Primary | ICD-10-CM

## 2018-08-27 LAB — HBA1C MFR BLD: 6.9 %

## 2018-08-27 PROCEDURE — 80053 COMPREHEN METABOLIC PANEL: CPT

## 2018-08-27 PROCEDURE — 80061 LIPID PANEL: CPT

## 2018-08-27 PROCEDURE — G0103 PSA SCREENING: HCPCS

## 2018-08-27 PROCEDURE — 84443 ASSAY THYROID STIM HORMONE: CPT

## 2018-08-27 PROCEDURE — 99396 PREV VISIT EST AGE 40-64: CPT | Performed by: PHYSICIAN ASSISTANT

## 2018-08-27 PROCEDURE — G0402 INITIAL PREVENTIVE EXAM: HCPCS | Performed by: PHYSICIAN ASSISTANT

## 2018-08-27 PROCEDURE — 85025 COMPLETE CBC W/AUTO DIFF WBC: CPT

## 2018-08-27 PROCEDURE — 86803 HEPATITIS C AB TEST: CPT

## 2018-08-27 PROCEDURE — 36415 COLL VENOUS BLD VENIPUNCTURE: CPT

## 2018-08-27 PROCEDURE — 83036 HEMOGLOBIN GLYCOSYLATED A1C: CPT | Performed by: PHYSICIAN ASSISTANT

## 2018-08-27 PROCEDURE — 82043 UR ALBUMIN QUANTITATIVE: CPT

## 2018-08-27 NOTE — PROGRESS NOTES
QUICK REFERENCE INFORMATION:  The ABCs of the Annual Wellness Visit    Welcome to Medicare Visit    HEALTH RISK ASSESSMENT    1960    Recent Hospitalizations:  No hospitalization(s) within the last year..      Current Medical Providers:  Patient Care Team:  Codey Josue PA as PCP - General (Family Medicine)  Favian Mojica MD as Surgeon (Neurosurgery)  Brandon Saleh MD as Consulting Physician (Anesthesiology)      Smoking Status:  History   Smoking Status   • Former Smoker   Smokeless Tobacco   • Never Used       Alcohol Consumption:  History   Alcohol Use No       Depression Screen:   PHQ-2/PHQ-9 Depression Screening 8/27/2018   Little interest or pleasure in doing things 0   Feeling down, depressed, or hopeless 0   Total Score 0       Health Habits and Functional and Cognitive Screening:  Functional & Cognitive Status 8/27/2018   Do you have difficulty preparing food and eating? No   Do you have difficulty bathing yourself, getting dressed or grooming yourself? No   Do you have difficulty using the toilet? No   Do you have difficulty moving around from place to place? No   Do you have trouble with steps or getting out of a bed or a chair? No   In the past year have you fallen or experienced a near fall? No   Current Diet Well Balanced Diet   Dental Exam Not up to date   Eye Exam Up to date   Exercise (times per week) 3 times per week   Current Exercise Activities Include Light Weight/[employer]   Do you need help using the phone?  No   Are you deaf or do you have serious difficulty hearing?  No   Do you need help with transportation? No   Do you need help shopping? No   Do you need help preparing meals?  No   Do you need help with housework?  No   Do you need help with laundry? No   Do you need help taking your medications? No   Do you need help managing money? No   Do you ever drive or ride in a car without wearing a seat belt? No   Have you felt unusual stress, anger or loneliness in the last  month? No   Who do you live with? Alone   If you need help, do you have trouble finding someone available to you? No   Have you been bothered in the last four weeks by sexual problems? No   Do you have difficulty concentrating, remembering or making decisions? No           Does the patient have evidence of cognitive impairment? Yes    Aspirin use counseling? Start ASA 81 mg daily       Recent Lab Results:  CMP:  Lab Results   Component Value Date    BUN 13 03/27/2018    CREATININE 0.80 03/27/2018    EGFRIFNONA 100 03/27/2018    BCR 16.3 03/27/2018     03/27/2018    K 4.4 03/27/2018    CO2 24.0 03/27/2018    CALCIUM 9.8 03/27/2018    ALBUMIN 4.70 03/27/2018    BILITOT 0.4 03/27/2018    ALKPHOS 69 03/27/2018    AST 33 03/27/2018    ALT 68 (H) 03/27/2018     Lipid Panel:  Lab Results   Component Value Date    CHOL 202 (H) 02/22/2017    TRIG 325 (H) 02/22/2017    HDL 45 02/22/2017     HbA1c:  Lab Results   Component Value Date    HGBA1C 6.9 08/27/2018       Visual Acuity:  No exam data present    Age-appropriate Screening Schedule:  Refer to the list below for future screening recommendations based on patient's age, sex and/or medical conditions. Orders for these recommended tests are listed in the plan section. The patient has been provided with a written plan.    Health Maintenance   Topic Date Due   • PNEUMOCOCCAL VACCINE (19-64 MEDIUM RISK) (1 of 1 - PPSV23) 01/04/1980   • TDAP/TD VACCINES (1 - Tdap) 01/04/1980   • ZOSTER VACCINE (1 of 2) 01/04/2011   • DIABETIC FOOT EXAM  05/25/2016   • INFLUENZA VACCINE  08/01/2018   • HEMOGLOBIN A1C  02/27/2019   • DIABETIC EYE EXAM  07/10/2019   • URINE MICROALBUMIN  08/27/2019   • COLONOSCOPY  10/13/2027        Subjective   History of Present Illness    Chandler Mar is a 58 y.o. male an established patient presenting for a Welcome to Medicare Visit.     The following portions of the patient's history were reviewed and updated as appropriate: allergies, current  medications, past family history, past medical history, past social history, past surgical history and problem list.    Outpatient Medications Prior to Visit   Medication Sig Dispense Refill   • ACCU-CHEK SOFTCLIX LANCETS lancets 1 each.     • glimepiride (AMARYL) 4 MG tablet TAKE 1 TABLET BY MOUTH EVERY MORNING BEFORE BREAKFAST 90 tablet 1   • glucose blood test strip Use as instructed 100 each 12   • glucose blood test strip Test blood sugar three times daily 100 each 12   • latanoprost (XALATAN) 0.005 % ophthalmic solution      • levothyroxine (SYNTHROID, LEVOTHROID) 150 MCG tablet TAKE ONE TABLET BY MOUTH ONCE DAILY 90 tablet 3   • metFORMIN (GLUCOPHAGE) 1000 MG tablet Take 1 tablet by mouth 2 (Two) Times a Day With Meals. 180 tablet 3   • metoprolol tartrate (LOPRESSOR) 25 MG tablet TAKE 1 TABLET BY MOUTH TWICE DAILY MAY  BREAK  IN  HALF  IF  NEEDED 180 tablet 1   • Omega-3 Fatty Acids (OMEGA 3 PO) Take  by mouth.     • probiotic (CULTURELLE) capsule capsule Take 1 capsule by mouth Daily.     • trimethoprim-polymyxin b (POLYTRIM) 78247-5.1 UNIT/ML-% ophthalmic solution      • amLODIPine (NORVASC) 5 MG tablet TAKE 1 TABLET BY MOUTH ONCE DAILY 90 tablet 3   • Multiple Vitamins-Minerals (MULTIVITAMIN ADULT PO) Take  by mouth.       No facility-administered medications prior to visit.        Patient Active Problem List   Diagnosis   • Back pain   • Essential hypertension   • Type 2 diabetes mellitus without complication, without long-term current use of insulin (CMS/[facility])   • Morbid obesity (CMS/[facility])   • Severe obstructive sleep apnea       Advance Care Planning:  has NO advance directive - information provided to the patient today    Identification of Risk Factors:  Risk factors include: weight , unhealthy diet and cardiovascular risk.    Review of Systems   Constitutional: Positive for fatigue.   HENT: Negative.    Eyes: Negative.    Respiratory: Negative.    Cardiovascular: Negative.    Gastrointestinal:  "Negative.    Endocrine: Negative.    Genitourinary: Negative.    Musculoskeletal: Negative.    Skin: Negative.    Allergic/Immunologic: Negative.    Neurological: Negative.    Hematological: Negative.    Psychiatric/Behavioral: Negative.    All other systems reviewed and are negative.      Compared to one year ago, the patient feels his physical health is better.  Compared to one year ago, the patient feels his mental health is better.    Objective    Physical Exam   Constitutional: He is oriented to person, place, and time. He appears well-developed and well-nourished.   HENT:   Head: Normocephalic and atraumatic.   Right Ear: External ear normal.   Left Ear: External ear normal.   Nose: Nose normal.   Mouth/Throat: Oropharynx is clear and moist.   Eyes: Pupils are equal, round, and reactive to light. Conjunctivae and EOM are normal.   Neck: Normal range of motion. Neck supple. No thyromegaly present.   Cardiovascular: Normal rate, regular rhythm, normal heart sounds and intact distal pulses.    No murmur heard.  Pulmonary/Chest: Effort normal and breath sounds normal.   Abdominal: Soft. Bowel sounds are normal. He exhibits no mass. There is no tenderness.   Genitourinary: Rectum normal, prostate normal and penis normal.   Musculoskeletal: Normal range of motion. He exhibits no edema.   Neurological: He is alert and oriented to person, place, and time. He has normal reflexes. No cranial nerve deficit.   Skin: Skin is warm and dry.   Psychiatric: He has a normal mood and affect.       Vitals:    08/27/18 1041   BP: 146/94   Pulse: 88   Resp: 14   Temp: 98.1 °F (36.7 °C)   TempSrc: Oral   SpO2: 98%   Weight: (!) 139 kg (306 lb 12.8 oz)   Height: 170.2 cm (67.01\")   PainSc:   6   PainLoc: Back  Comment: Rt leg       Patient's Body mass index is 48.04 kg/m². BMI is above normal parameters. Recommendations include: educational material.      Procedure   Procedures       Assessment/Plan   Patient Self-Management and " Personalized Health Advice  The patient has been provided with information about: diet, exercise and weight management and preventive services including:   · Advance directive, Nutrition counseling provided.    Visit Diagnoses:    ICD-10-CM ICD-9-CM   1. Welcome to Medicare preventive visit Z00.00 V70.0   2. Routine general medical examination at a health care facility Z00.00 V70.0   3. Special screening for malignant neoplasm of prostate Z12.5 V76.44       Orders Placed This Encounter   Procedures   • Comprehensive Metabolic Panel     Standing Status:   Future     Standing Expiration Date:   8/27/2019   • PSA Screen     Standing Status:   Future     Standing Expiration Date:   8/27/2019   • Lipid Panel     Standing Status:   Future     Standing Expiration Date:   8/27/2019   • TSH     Standing Status:   Future     Standing Expiration Date:   8/27/2019   • MicroAlbumin, Urine, Random - Urine, Clean Catch     Standing Status:   Future     Standing Expiration Date:   8/27/2019   • Hepatitis C Antibody     Standing Status:   Future     Standing Expiration Date:   8/27/2019   • POC Glycosylated Hemoglobin (Hb A1C)   • CBC & Differential     Standing Status:   Future     Standing Expiration Date:   8/27/2019     Order Specific Question:   Manual Differential     Answer:   No       Outpatient Encounter Prescriptions as of 8/27/2018   Medication Sig Dispense Refill   • ACCU-CHEK SOFTCLIX LANCETS lancets 1 each.     • glimepiride (AMARYL) 4 MG tablet TAKE 1 TABLET BY MOUTH EVERY MORNING BEFORE BREAKFAST 90 tablet 1   • glucose blood test strip Use as instructed 100 each 12   • glucose blood test strip Test blood sugar three times daily 100 each 12   • latanoprost (XALATAN) 0.005 % ophthalmic solution      • levothyroxine (SYNTHROID, LEVOTHROID) 150 MCG tablet TAKE ONE TABLET BY MOUTH ONCE DAILY 90 tablet 3   • metFORMIN (GLUCOPHAGE) 1000 MG tablet Take 1 tablet by mouth 2 (Two) Times a Day With Meals. 180 tablet 3   •  metoprolol tartrate (LOPRESSOR) 25 MG tablet TAKE 1 TABLET BY MOUTH TWICE DAILY MAY  BREAK  IN  HALF  IF  NEEDED 180 tablet 1   • Omega-3 Fatty Acids (OMEGA 3 PO) Take  by mouth.     • probiotic (CULTURELLE) capsule capsule Take 1 capsule by mouth Daily.     • trimethoprim-polymyxin b (POLYTRIM) 14091-6.1 UNIT/ML-% ophthalmic solution      • [DISCONTINUED] amLODIPine (NORVASC) 5 MG tablet TAKE 1 TABLET BY MOUTH ONCE DAILY 90 tablet 3   • [DISCONTINUED] Multiple Vitamins-Minerals (MULTIVITAMIN ADULT PO) Take  by mouth.       No facility-administered encounter medications on file as of 8/27/2018.        Reviewed use of high risk medication in the elderly: yes  Reviewed for potential of harmful drug interactions in the elderly: yes    Follow Up:  No Follow-up on file.     An After Visit Summary and PPPS with all of these plans were given to the patient.

## 2018-09-11 DIAGNOSIS — G47.33 OSA (OBSTRUCTIVE SLEEP APNEA): ICD-10-CM

## 2018-09-11 DIAGNOSIS — G47.34 NOCTURNAL HYPOXEMIA: ICD-10-CM

## 2018-10-10 DIAGNOSIS — G47.33 SEVERE OBSTRUCTIVE SLEEP APNEA: Primary | ICD-10-CM

## 2018-10-10 DIAGNOSIS — G47.34 NOCTURNAL HYPOXEMIA: ICD-10-CM

## 2018-11-14 ENCOUNTER — TELEPHONE (OUTPATIENT)
Dept: FAMILY MEDICINE CLINIC | Facility: CLINIC | Age: 58
End: 2018-11-14

## 2018-11-14 DIAGNOSIS — I10 ESSENTIAL HYPERTENSION: ICD-10-CM

## 2018-11-14 NOTE — TELEPHONE ENCOUNTER
----- Message from Nydia Mckeon sent at 11/14/2018  9:47 AM EST -----  Contact: PATIENT  CAN HE GET A 90 DAY REFILL CALLED IN FOR:    metoprolol tartrate (LOPRESSOR) 25 MG tablet 180 tablet    Sig: TAKE 1 TABLET BY MOUTH TWICE DAILY MAY  BREAK  IN  HALF  IF  NEEDED     Associated Diagnoses     Essential hypertension     Pharmacy     [address] [phone] [facility] [phone] FX

## 2018-11-19 DIAGNOSIS — Z12.11 SCREENING FOR COLON CANCER: Primary | ICD-10-CM

## 2018-11-19 RX ORDER — SODIUM, POTASSIUM,MAG SULFATES 17.5-3.13G
SOLUTION, RECONSTITUTED, ORAL ORAL
Qty: 2 BOTTLE | Refills: 0 | Status: SHIPPED | OUTPATIENT
Start: 2018-11-19 | End: 2019-03-27

## 2018-11-26 ENCOUNTER — LAB REQUISITION (OUTPATIENT)
Dept: LAB | Facility: HOSPITAL | Age: 58
End: 2018-11-26

## 2018-11-26 ENCOUNTER — OUTSIDE FACILITY SERVICE (OUTPATIENT)
Dept: GASTROENTEROLOGY | Facility: CLINIC | Age: 58
End: 2018-11-26

## 2018-11-26 DIAGNOSIS — Z86.010 HISTORY OF COLONIC POLYPS: ICD-10-CM

## 2018-11-26 PROCEDURE — 45385 COLONOSCOPY W/LESION REMOVAL: CPT | Performed by: INTERNAL MEDICINE

## 2018-11-26 PROCEDURE — 88305 TISSUE EXAM BY PATHOLOGIST: CPT | Performed by: INTERNAL MEDICINE

## 2018-11-27 LAB
CYTO UR: NORMAL
LAB AP CASE REPORT: NORMAL
LAB AP CLINICAL INFORMATION: NORMAL
PATH REPORT.FINAL DX SPEC: NORMAL
PATH REPORT.GROSS SPEC: NORMAL

## 2018-12-28 ENCOUNTER — TELEPHONE (OUTPATIENT)
Dept: FAMILY MEDICINE CLINIC | Facility: CLINIC | Age: 58
End: 2018-12-28

## 2018-12-28 NOTE — TELEPHONE ENCOUNTER
----- Message from Jasmine Bolaños sent at 12/28/2018 10:40 AM EST -----  Contact: PT  COMPLETELY OUT OF MED, CHECKING STATUS OF REFILL REQUEST      MetFORMIN HCl 1000 MG TAKE 1 TABLET BY MOUTH TWICE DAILY WITH FOOD     [facility] [address] [place] [address] - [phone]  - [phone] (Phone)  [phone] (Fax)

## 2019-02-26 DIAGNOSIS — E11.9 TYPE 2 DIABETES MELLITUS WITHOUT COMPLICATION, WITHOUT LONG-TERM CURRENT USE OF INSULIN (HCC): ICD-10-CM

## 2019-02-26 RX ORDER — GLIMEPIRIDE 4 MG/1
4 TABLET ORAL
Qty: 90 TABLET | Refills: 1 | Status: CANCELLED | OUTPATIENT
Start: 2019-02-26

## 2019-02-28 ENCOUNTER — TELEPHONE (OUTPATIENT)
Dept: FAMILY MEDICINE CLINIC | Facility: CLINIC | Age: 59
End: 2019-02-28

## 2019-02-28 DIAGNOSIS — E11.9 TYPE 2 DIABETES MELLITUS WITHOUT COMPLICATION, WITHOUT LONG-TERM CURRENT USE OF INSULIN (HCC): ICD-10-CM

## 2019-02-28 RX ORDER — GLIMEPIRIDE 4 MG/1
4 TABLET ORAL
Qty: 90 TABLET | Refills: 0 | Status: SHIPPED | OUTPATIENT
Start: 2019-02-28 | End: 2019-04-27 | Stop reason: SDUPTHER

## 2019-02-28 NOTE — TELEPHONE ENCOUNTER
----- Message from Jasmine Bolaños sent at 2/27/2019  4:27 PM EST -----  Contact: PT  REFILL NEEDED    glimepiride (AMARYL) 4 MG tablet [Pharmacy Med Name: GLIMEPIRIDE 4MG     TAB] [[id number]]   Order Details   Dose: 4 mg Route: Oral Frequency: Every Morning Before Breakfast  Dispense Quantity: 90 tablet Refills: 1 Fills remaining: --         Sig: TAKE 1 TABLET BY MOUTH EVERY MORNING BEFORE BREAKFAST        Written Date: -- Expiration Date: -- Ordering Date: 02/26/19   Start Date: 02/26/19 End Date: --          Ordering Provider:  -- Phone:  -- Fax:  --   Address:  -- NPI:  --          Authorizing Provider:  Codey Josue Phone:  [phone] Fax:  [phone]   Address:  [address] [place] [address] [address] [address] NPI:  [id number]          Ordering User:  Interface, Surescripts In          Diagnosis Association: Type 2 diabetes mellitus without complication, without long-term current use of insulin (CMS/[facility]) (E11.9)    Original Order:  glimepiride (AMARYL) 4 MG tablet [[id number]]    Pharmacy:  [facility] [address] [place] [address] [phone] [facility] [phone]  Phone:  [phone] Fax:  [phone]   Address:  [address] [place] [address]

## 2019-03-25 ENCOUNTER — HOSPITAL ENCOUNTER (EMERGENCY)
Facility: HOSPITAL | Age: 59
Discharge: HOME | End: 2019-03-25
Attending: EMERGENCY MEDICINE | Admitting: EMERGENCY MEDICINE

## 2019-03-25 VITALS
HEART RATE: 97 BPM | BODY MASS INDEX: 46.3 KG/M2 | SYSTOLIC BLOOD PRESSURE: 143 MMHG | DIASTOLIC BLOOD PRESSURE: 80 MMHG | WEIGHT: 295 LBS | RESPIRATION RATE: 18 BRPM | OXYGEN SATURATION: 95 % | TEMPERATURE: 98.7 F | HEIGHT: 67 IN

## 2019-03-25 DIAGNOSIS — J06.9 UPPER RESPIRATORY TRACT INFECTION, UNSPECIFIED TYPE: ICD-10-CM

## 2019-03-25 DIAGNOSIS — H10.33 ACUTE CONJUNCTIVITIS OF BOTH EYES, UNSPECIFIED ACUTE CONJUNCTIVITIS TYPE: Primary | ICD-10-CM

## 2019-03-25 LAB
FLUAV AG NPH QL: NEGATIVE
FLUBV AG NPH QL IA: NEGATIVE

## 2019-03-25 PROCEDURE — 99283 EMERGENCY DEPT VISIT LOW MDM: CPT

## 2019-03-25 PROCEDURE — 87804 INFLUENZA ASSAY W/OPTIC: CPT | Performed by: PHYSICIAN ASSISTANT

## 2019-03-25 RX ORDER — AZITHROMYCIN 250 MG/1
250 TABLET, FILM COATED ORAL DAILY
Qty: 4 TABLET | Refills: 0 | Status: SHIPPED | OUTPATIENT
Start: 2019-03-25 | End: 2019-03-27

## 2019-03-25 RX ORDER — AZITHROMYCIN 250 MG/1
500 TABLET, FILM COATED ORAL ONCE
Status: COMPLETED | OUTPATIENT
Start: 2019-03-25 | End: 2019-03-25

## 2019-03-25 RX ORDER — GENTAMICIN SULFATE 3 MG/ML
2 SOLUTION/ DROPS OPHTHALMIC 4 TIMES DAILY
Status: DISCONTINUED | OUTPATIENT
Start: 2019-03-25 | End: 2019-03-26 | Stop reason: HOSPADM

## 2019-03-25 RX ADMIN — GENTAMICIN SULFATE 2 DROP: 3 SOLUTION/ DROPS OPHTHALMIC at 22:54

## 2019-03-25 RX ADMIN — AZITHROMYCIN 500 MG: 250 TABLET, FILM COATED ORAL at 22:55

## 2019-03-26 DIAGNOSIS — I10 ESSENTIAL HYPERTENSION: ICD-10-CM

## 2019-03-27 ENCOUNTER — OFFICE VISIT (OUTPATIENT)
Dept: FAMILY MEDICINE CLINIC | Facility: CLINIC | Age: 59
End: 2019-03-27

## 2019-03-27 ENCOUNTER — TELEPHONE (OUTPATIENT)
Dept: FAMILY MEDICINE CLINIC | Facility: CLINIC | Age: 59
End: 2019-03-27

## 2019-03-27 VITALS
DIASTOLIC BLOOD PRESSURE: 94 MMHG | HEART RATE: 91 BPM | RESPIRATION RATE: 18 BRPM | OXYGEN SATURATION: 98 % | WEIGHT: 304 LBS | HEIGHT: 67 IN | SYSTOLIC BLOOD PRESSURE: 151 MMHG | BODY MASS INDEX: 47.71 KG/M2

## 2019-03-27 DIAGNOSIS — E11.9 TYPE 2 DIABETES MELLITUS WITHOUT COMPLICATION, WITHOUT LONG-TERM CURRENT USE OF INSULIN (HCC): Primary | ICD-10-CM

## 2019-03-27 DIAGNOSIS — J01.10 ACUTE FRONTAL SINUSITIS, RECURRENCE NOT SPECIFIED: ICD-10-CM

## 2019-03-27 LAB
GLUCOSE BLDC GLUCOMTR-MCNC: 259 MG/DL (ref 70–130)
HBA1C MFR BLD: 7.8 %

## 2019-03-27 PROCEDURE — 82962 GLUCOSE BLOOD TEST: CPT | Performed by: PHYSICIAN ASSISTANT

## 2019-03-27 PROCEDURE — 83036 HEMOGLOBIN GLYCOSYLATED A1C: CPT | Performed by: PHYSICIAN ASSISTANT

## 2019-03-27 PROCEDURE — 99214 OFFICE O/P EST MOD 30 MIN: CPT | Performed by: PHYSICIAN ASSISTANT

## 2019-03-27 RX ORDER — DOXYCYCLINE HYCLATE 100 MG/1
100 CAPSULE ORAL 2 TIMES DAILY
Qty: 20 CAPSULE | Refills: 0 | Status: SHIPPED | OUTPATIENT
Start: 2019-03-27 | End: 2019-04-09 | Stop reason: SDUPTHER

## 2019-03-27 RX ORDER — FLUTICASONE PROPIONATE 50 MCG
2 SPRAY, SUSPENSION (ML) NASAL DAILY
Qty: 9.9 ML | Refills: 1 | Status: SHIPPED | OUTPATIENT
Start: 2019-03-27 | End: 2022-05-09

## 2019-03-27 NOTE — TELEPHONE ENCOUNTER
----- Message from Katie Gorman sent at 3/27/2019 12:27 PM EDT -----  Contact: PT.  PT. SAW ONUR TODAY.  PT. IS NEEDING HIS BP MEDICATION.  RX WAS SUPPOSED TO FAX IN REQUEST 2 DAYS AGO.    RX=[facility].    PT. CAN BE REACHED @ [facility] #.

## 2019-03-29 NOTE — PROGRESS NOTES
Subjective   Chandler Mar is a 59 y.o. male  URI (Seen in [facility] ED March 2 and prescribed Zpak. He still c/o bilat ear pain, nasal congestion, productive cough (yellow). Has been using OTC cough syrup.) and Diabetes (F/U.  and A1C 7.8%)      History of Present Illness  Patient is a 59-year-old white male who comes in complaint of sinus pressure sinus congestion blowing green yellow drainage from nose large mass sinus pressure congestion blowing green yellow drainage from nose mild sore throat cough has been productive with green yellow sputum large mass sinus pressure and congestion frontal headache symptoms been present for 3 days over-the-counter cough syrup not working patient comes in for follow-up of type 2 diabetes A1c is 7.8 total resting blood sugars 259 patient has not been watching his diet as closely still having trouble with weight gain  The following portions of the patient's history were reviewed and updated as appropriate: allergies, current medications, past social history and problem list    Review of Systems   Constitutional: Negative for chills, fatigue and fever.   HENT: Positive for congestion, ear pain, postnasal drip, rhinorrhea and sinus pressure. Negative for sore throat.    Eyes: Positive for pain.   Respiratory: Positive for cough. Negative for shortness of breath.    Neurological: Positive for headaches. Negative for dizziness.   Hematological: Negative for adenopathy.       Objective     Vitals:    03/27/19 1107   BP: 151/94   Pulse: 91   Resp: 18   SpO2: 98%       Physical Exam   Constitutional: He appears well-developed and well-nourished.   HENT:   Head: Normocephalic and atraumatic.   Right Ear: Tympanic membrane and ear canal normal.   Left Ear: Tympanic membrane and ear canal normal.   Nose: Mucosal edema, rhinorrhea and sinus tenderness present. Right sinus exhibits maxillary sinus tenderness and frontal sinus tenderness. Left sinus exhibits maxillary sinus tenderness and  frontal sinus tenderness.   Mouth/Throat: Oropharynx is clear and moist. No oropharyngeal exudate.   Eyes: Pupils are equal, round, and reactive to light.   Cardiovascular: Normal rate and regular rhythm.   Pulmonary/Chest: Effort normal and breath sounds normal.   Nursing note and vitals reviewed.      Assessment/Plan     Diagnoses and all orders for this visit:    Type 2 diabetes mellitus without complication, without long-term current use of insulin (CMS/[facility])  -     POC Glycosylated Hemoglobin (Hb A1C)  -     POCT Glucose    Acute frontal sinusitis, recurrence not specified  -     doxycycline (VIBRAMYCIN) 100 MG capsule; Take 1 capsule by mouth 2 (Two) Times a Day.  -     fluticasone (FLONASE) 50 MCG/ACT nasal spray; 2 sprays into the nostril(s) as directed by provider Daily.    #1 Long discussion with patient about diet and exercise discussed low-carb low-calorie diet exercise 3-5 times per week for 30 minutes recheck in 3 months    2.  Doxycycline 100 mg twice a day dispense 20    Flonase nasal spray 2 sprays each nostril#1

## 2019-04-09 ENCOUNTER — TELEPHONE (OUTPATIENT)
Dept: FAMILY MEDICINE CLINIC | Facility: CLINIC | Age: 59
End: 2019-04-09

## 2019-04-09 RX ORDER — DOXYCYCLINE HYCLATE 100 MG/1
100 CAPSULE ORAL 2 TIMES DAILY
Qty: 20 CAPSULE | Refills: 0 | Status: SHIPPED | OUTPATIENT
Start: 2019-04-09 | End: 2019-08-29

## 2019-04-09 NOTE — TELEPHONE ENCOUNTER
"----- Message from Katie Gorman sent at 4/9/2019 11:17 AM EDT -----  Contact: PT.  PT. SEE'WALT MOHR.  PT. STATED CALLED IN A PRESCRIPTION FOR NASAL SPRAY & ANTIBIOTICS FOR 10 DAYS; STILL NO RELIEF, YOON. FOR THE EAR \"STOPPED UP\".   STATED PT. HAD A BAD URI & EYE INFECTION.  WHAT IS NEXT PLAN OF CARE?    RX=[facility].    PT. CAN BE REACHED @ [facility] #.  "

## 2019-04-24 ENCOUNTER — TELEPHONE (OUTPATIENT)
Dept: FAMILY MEDICINE CLINIC | Facility: CLINIC | Age: 59
End: 2019-04-24

## 2019-04-25 ENCOUNTER — TRANSCRIBE ORDERS (OUTPATIENT)
Dept: FAMILY MEDICINE CLINIC | Facility: CLINIC | Age: 59
End: 2019-04-25

## 2019-04-25 DIAGNOSIS — H91.93 BILATERAL HEARING LOSS, UNSPECIFIED HEARING LOSS TYPE: Primary | ICD-10-CM

## 2019-04-27 DIAGNOSIS — E11.9 TYPE 2 DIABETES MELLITUS WITHOUT COMPLICATION, WITHOUT LONG-TERM CURRENT USE OF INSULIN (HCC): ICD-10-CM

## 2019-04-29 RX ORDER — GLIMEPIRIDE 4 MG/1
TABLET ORAL
Qty: 90 TABLET | Refills: 1 | Status: SHIPPED | OUTPATIENT
Start: 2019-04-29 | End: 2019-08-22 | Stop reason: SDUPTHER

## 2019-06-27 ENCOUNTER — LAB (OUTPATIENT)
Dept: LAB | Facility: HOSPITAL | Age: 59
End: 2019-06-27

## 2019-06-27 ENCOUNTER — OFFICE VISIT (OUTPATIENT)
Dept: FAMILY MEDICINE CLINIC | Facility: CLINIC | Age: 59
End: 2019-06-27

## 2019-06-27 VITALS
WEIGHT: 305.8 LBS | BODY MASS INDEX: 48 KG/M2 | RESPIRATION RATE: 20 BRPM | SYSTOLIC BLOOD PRESSURE: 132 MMHG | HEART RATE: 80 BPM | TEMPERATURE: 98.3 F | DIASTOLIC BLOOD PRESSURE: 90 MMHG | OXYGEN SATURATION: 94 % | HEIGHT: 67 IN

## 2019-06-27 DIAGNOSIS — Z12.5 SPECIAL SCREENING FOR MALIGNANT NEOPLASM OF PROSTATE: ICD-10-CM

## 2019-06-27 DIAGNOSIS — E11.9 TYPE 2 DIABETES MELLITUS WITHOUT COMPLICATION, UNSPECIFIED WHETHER LONG TERM INSULIN USE (HCC): ICD-10-CM

## 2019-06-27 DIAGNOSIS — E03.2 HYPOTHYROIDISM DUE TO MEDICATION: ICD-10-CM

## 2019-06-27 DIAGNOSIS — E11.9 TYPE 2 DIABETES MELLITUS WITHOUT COMPLICATION, UNSPECIFIED WHETHER LONG TERM INSULIN USE (HCC): Primary | ICD-10-CM

## 2019-06-27 LAB
ALBUMIN SERPL-MCNC: 4.6 G/DL (ref 3.5–5.2)
ALBUMIN/GLOB SERPL: 1.4 G/DL
ALP SERPL-CCNC: 56 U/L (ref 39–117)
ALT SERPL W P-5'-P-CCNC: 32 U/L (ref 1–41)
ANION GAP SERPL CALCULATED.3IONS-SCNC: 12.8 MMOL/L (ref 5–15)
AST SERPL-CCNC: 20 U/L (ref 1–40)
BILIRUB SERPL-MCNC: 0.3 MG/DL (ref 0.2–1.2)
BUN BLD-MCNC: 11 MG/DL (ref 6–20)
BUN/CREAT SERPL: 13.3 (ref 7–25)
CALCIUM SPEC-SCNC: 9.8 MG/DL (ref 8.6–10.5)
CHLORIDE SERPL-SCNC: 102 MMOL/L (ref 98–107)
CHOLEST SERPL-MCNC: 180 MG/DL (ref 0–200)
CO2 SERPL-SCNC: 25.2 MMOL/L (ref 22–29)
CREAT BLD-MCNC: 0.83 MG/DL (ref 0.76–1.27)
EXPIRATION DATE: NORMAL
GFR SERPL CREATININE-BSD FRML MDRD: 95 ML/MIN/1.73
GLOBULIN UR ELPH-MCNC: 3.2 GM/DL
GLUCOSE BLD-MCNC: 160 MG/DL (ref 65–99)
HBA1C MFR BLD: 7.3 %
HDLC SERPL-MCNC: 40 MG/DL (ref 40–60)
LDLC SERPL CALC-MCNC: 106 MG/DL (ref 0–100)
LDLC/HDLC SERPL: 2.64 {RATIO}
Lab: NORMAL
POTASSIUM BLD-SCNC: 4.5 MMOL/L (ref 3.5–5.2)
PROT SERPL-MCNC: 7.8 G/DL (ref 6–8.5)
PSA SERPL-MCNC: 0.64 NG/ML (ref 0–4)
SODIUM BLD-SCNC: 140 MMOL/L (ref 136–145)
TRIGL SERPL-MCNC: 172 MG/DL (ref 0–150)
TSH SERPL DL<=0.05 MIU/L-ACNC: 0.33 MIU/ML (ref 0.27–4.2)
VLDLC SERPL-MCNC: 34.4 MG/DL (ref 5–40)

## 2019-06-27 PROCEDURE — 84443 ASSAY THYROID STIM HORMONE: CPT

## 2019-06-27 PROCEDURE — 80053 COMPREHEN METABOLIC PANEL: CPT

## 2019-06-27 PROCEDURE — G0103 PSA SCREENING: HCPCS

## 2019-06-27 PROCEDURE — 36415 COLL VENOUS BLD VENIPUNCTURE: CPT

## 2019-06-27 PROCEDURE — 80061 LIPID PANEL: CPT

## 2019-06-27 PROCEDURE — 83036 HEMOGLOBIN GLYCOSYLATED A1C: CPT | Performed by: PHYSICIAN ASSISTANT

## 2019-06-27 PROCEDURE — 99213 OFFICE O/P EST LOW 20 MIN: CPT | Performed by: PHYSICIAN ASSISTANT

## 2019-06-28 NOTE — PROGRESS NOTES
Subjective   Chandler Mar is a 59 y.o. male  Annual Exam (wellness intial)      History of Present Illness  Patient is a pleasant 59-year-old white male who comes in for fatigue no energy diabetes uncontrolled but has been doing better his A1c is 7.1 was 7.8 he is been trying to watch his diet and exercise he is slightly fatigued no shortness of breath no chest pain  The following portions of the patient's history were reviewed and updated as appropriate: allergies, current medications, past social history and problem list    Review of Systems   Constitutional: Negative for appetite change, diaphoresis, fatigue and unexpected weight change.   Eyes: Negative for visual disturbance.   Respiratory: Negative for chest tightness and shortness of breath.    Cardiovascular: Negative for chest pain, palpitations and leg swelling.   Gastrointestinal: Negative for diarrhea, nausea and vomiting.   Endocrine: Negative for polydipsia, polyphagia and polyuria.   Skin: Negative for color change.   Neurological: Negative for dizziness, weakness, light-headedness and numbness.       Objective     Vitals:    06/27/19 1137   BP: 132/90   Pulse: 80   Resp: 20   Temp: 98.3 °F (36.8 °C)   SpO2: 94%       Physical Exam   Constitutional: He appears well-developed and well-nourished.   Neck: Neck supple. No JVD present. No thyromegaly present.   Cardiovascular: Normal rate, regular rhythm, normal heart sounds, intact distal pulses and normal pulses.   No murmur heard.  Pulmonary/Chest: Effort normal and breath sounds normal. No respiratory distress.   Abdominal: Soft. Bowel sounds are normal. There is no hepatosplenomegaly. There is no tenderness.   Musculoskeletal: He exhibits no edema.   Lymphadenopathy:     He has no cervical adenopathy.   Neurological: No sensory deficit.   Skin: Skin is warm and dry. He is not diaphoretic.   Nursing note and vitals reviewed.      Assessment/Plan     Diagnoses and all orders for this visit:    Type 2  diabetes mellitus without complication, unspecified whether long term insulin use (CMS/HCC)  -     POC Glycosylated Hemoglobin (Hb A1C)  -     Comprehensive Metabolic Panel; Future  -     Lipid Panel; Future    Special screening for malignant neoplasm of prostate  -     PSA Screen; Future    Hypothyroidism due to medication  -     TSH; Future    Continue with diet and exercise recheck in 3 months

## 2019-08-16 ENCOUNTER — OFFICE VISIT (OUTPATIENT)
Dept: SLEEP MEDICINE | Facility: HOSPITAL | Age: 59
End: 2019-08-16

## 2019-08-16 VITALS
HEIGHT: 67 IN | DIASTOLIC BLOOD PRESSURE: 65 MMHG | SYSTOLIC BLOOD PRESSURE: 128 MMHG | BODY MASS INDEX: 47.68 KG/M2 | HEART RATE: 87 BPM | WEIGHT: 303.8 LBS | OXYGEN SATURATION: 96 %

## 2019-08-16 DIAGNOSIS — G47.33 OSA (OBSTRUCTIVE SLEEP APNEA): Primary | ICD-10-CM

## 2019-08-16 PROCEDURE — 99212 OFFICE O/P EST SF 10 MIN: CPT | Performed by: NURSE PRACTITIONER

## 2019-08-16 NOTE — PROGRESS NOTES
Chief Complaint:   Chief Complaint   Patient presents with   • Follow-up       HPI:    Chandler Mar is a 59 y.o. male here for follow-up of sleep apnea.  Patient was last seen 8/10/2018.  Patient states he is doing very well with CPAP.  Patient sleeping 8 hours nightly and feels rested upon awakening.  Patient has an Marenisco score of 2/24.  Patient has no concerns or complaints today and wishes to continue CPAP.        Current medications are:   Current Outpatient Medications:   •  ACCU-CHEK SOFTCLIX LANCETS lancets, 1 each., Disp: , Rfl:   •  betaxolol (BETOPTIC-S) 0.25 % ophthalmic suspension, 1 drop 2 (Two) Times a Day., Disp: , Rfl:   •  doxycycline (VIBRAMYCIN) 100 MG capsule, Take 1 capsule by mouth 2 (Two) Times a Day., Disp: 20 capsule, Rfl: 0  •  fluticasone (FLONASE) 50 MCG/ACT nasal spray, 2 sprays into the nostril(s) as directed by provider Daily., Disp: 9.9 mL, Rfl: 1  •  glimepiride (AMARYL) 4 MG tablet, Take one tablet by mouth in the morning before breakfast., Disp: 90 tablet, Rfl: 1  •  glucose blood test strip, Test blood sugar three times daily Dx E11.9, Disp: 100 each, Rfl: 12  •  latanoprost (XALATAN) 0.005 % ophthalmic solution, , Disp: , Rfl:   •  levothyroxine (SYNTHROID, LEVOTHROID) 150 MCG tablet, TAKE ONE TABLET BY MOUTH ONCE DAILY, Disp: 90 tablet, Rfl: 3  •  metFORMIN (GLUCOPHAGE) 1000 MG tablet, TAKE 1 TABLET BY MOUTH TWICE DAILY WITH FOOD, Disp: 180 tablet, Rfl: 1  •  metoprolol tartrate (LOPRESSOR) 25 MG tablet, TAKE 1 TABLET BY MOUTH TWICE DAILY **MAY BREAK IN HALF IF NEEDED**, Disp: 180 tablet, Rfl: 3  •  Omega-3 Fatty Acids (OMEGA 3 PO), Take  by mouth., Disp: , Rfl:   •  probiotic (CULTURELLE) capsule capsule, Take 1 capsule by mouth Daily., Disp: , Rfl:   •  trimethoprim-polymyxin b (POLYTRIM) 19657-7.1 UNIT/ML-% ophthalmic solution, , Disp: , Rfl: .      The patient's relevant past medical, surgical, family and social history were reviewed and updated in Epic as  appropriate.       Review of Systems   HENT: Positive for hearing loss.    Eyes: Positive for visual disturbance.   Respiratory: Positive for apnea.    Gastrointestinal: Positive for constipation and diarrhea.   Endocrine: Positive for cold intolerance and heat intolerance.   Musculoskeletal: Positive for arthralgias, gait problem and joint swelling.   Psychiatric/Behavioral: Positive for sleep disturbance.   All other systems reviewed and are negative.        Objective:    Physical Exam   Constitutional: He is oriented to person, place, and time. He appears well-developed and well-nourished.   HENT:   Head: Normocephalic and atraumatic.   Mouth/Throat: Oropharynx is clear and moist.   Class 4 airway   Eyes: Conjunctivae are normal.   Neck: Neck supple. No thyromegaly present.   Cardiovascular: Normal rate and regular rhythm.   Pulmonary/Chest: Effort normal and breath sounds normal.   Lymphadenopathy:     He has no cervical adenopathy.   Neurological: He is alert and oriented to person, place, and time.   Skin: Skin is warm and dry.   Psychiatric: He has a normal mood and affect. His behavior is normal. Judgment and thought content normal.   Nursing note and vitals reviewed.  180/1 80 days of use.  Greater than 4-hour use 100%.  9% pressure 9.9.  AHI 2.5.  Download reviewed with patient.      ASSESSMENT/PLAN    Chandler was seen today for follow-up.    Diagnoses and all orders for this visit:    LUISANA (obstructive sleep apnea)  -     CPAP Therapy            1. Counseled patient regarding multimodal approach with healthy nutrition, healthy sleep, regular physical activity, social activities, counseling, and medications. Encouraged to practice lateral sleep position. Avoid alcohol and sedatives close to bedtime.  2. Refill supplies x1 year.  Return to clinic 1 year or sooner if symptoms warrant.    I have reviewed the results of my evaluation and impression and discussed my recommendations in detail with the  patient.      Signed by  Christine Shah, APRN    August 16, 2019      CC: Codey Josue PA          No ref. provider found

## 2019-08-22 DIAGNOSIS — E03.9 HYPOTHYROIDISM, UNSPECIFIED TYPE: ICD-10-CM

## 2019-08-22 DIAGNOSIS — E11.9 TYPE 2 DIABETES MELLITUS WITHOUT COMPLICATION, WITHOUT LONG-TERM CURRENT USE OF INSULIN (HCC): ICD-10-CM

## 2019-08-22 RX ORDER — LEVOTHYROXINE SODIUM 0.15 MG/1
TABLET ORAL
Qty: 90 TABLET | Refills: 3 | Status: SHIPPED | OUTPATIENT
Start: 2019-08-22 | End: 2020-01-22 | Stop reason: SDUPTHER

## 2019-08-22 RX ORDER — GLIMEPIRIDE 4 MG/1
TABLET ORAL
Qty: 90 TABLET | Refills: 1 | Status: SHIPPED | OUTPATIENT
Start: 2019-08-22 | End: 2020-02-07 | Stop reason: SDUPTHER

## 2019-08-29 ENCOUNTER — OFFICE VISIT (OUTPATIENT)
Dept: FAMILY MEDICINE CLINIC | Facility: CLINIC | Age: 59
End: 2019-08-29

## 2019-08-29 ENCOUNTER — LAB (OUTPATIENT)
Dept: LAB | Facility: HOSPITAL | Age: 59
End: 2019-08-29

## 2019-08-29 VITALS
HEART RATE: 78 BPM | DIASTOLIC BLOOD PRESSURE: 84 MMHG | WEIGHT: 306 LBS | BODY MASS INDEX: 48.03 KG/M2 | HEIGHT: 67 IN | SYSTOLIC BLOOD PRESSURE: 122 MMHG | TEMPERATURE: 97 F | OXYGEN SATURATION: 99 %

## 2019-08-29 DIAGNOSIS — Z00.00 MEDICARE ANNUAL WELLNESS VISIT, INITIAL: Primary | ICD-10-CM

## 2019-08-29 DIAGNOSIS — Z00.00 ROUTINE GENERAL MEDICAL EXAMINATION AT A HEALTH CARE FACILITY: ICD-10-CM

## 2019-08-29 DIAGNOSIS — Z00.00 MEDICARE ANNUAL WELLNESS VISIT, INITIAL: ICD-10-CM

## 2019-08-29 LAB
ALBUMIN UR-MCNC: <1.2 MG/DL
HBA1C MFR BLD: 7.2 % (ref 4.8–5.6)
TSH SERPL DL<=0.05 MIU/L-ACNC: 0.27 UIU/ML (ref 0.27–4.2)

## 2019-08-29 PROCEDURE — G0438 PPPS, INITIAL VISIT: HCPCS | Performed by: PHYSICIAN ASSISTANT

## 2019-08-29 PROCEDURE — 36415 COLL VENOUS BLD VENIPUNCTURE: CPT

## 2019-08-29 PROCEDURE — 84443 ASSAY THYROID STIM HORMONE: CPT

## 2019-08-29 PROCEDURE — 99396 PREV VISIT EST AGE 40-64: CPT | Performed by: PHYSICIAN ASSISTANT

## 2019-08-29 PROCEDURE — 83036 HEMOGLOBIN GLYCOSYLATED A1C: CPT

## 2019-08-29 PROCEDURE — 82043 UR ALBUMIN QUANTITATIVE: CPT

## 2019-08-29 NOTE — PROGRESS NOTES
The ABCs of the Annual Wellness Visit  Initial Medicare Wellness Visit    Chief Complaint   Patient presents with   • Annual Exam     Medicare wellness       Subjective   History of Present Illness:  Chandler Mar is a 59 y.o. male who presents for an Initial Medicare Wellness Visit.    HEALTH RISK ASSESSMENT    Recent Hospitalizations:  No hospitalization(s) within the last year.    Current Medical Providers:  Patient Care Team:  Codey Josue PA as PCP - General (Family Medicine)  Favian Mojica MD as Surgeon (Neurosurgery)  Brandon Saleh MD as Consulting Physician (Anesthesiology)    Smoking Status:  Social History     Tobacco Use   Smoking Status Former Smoker   Smokeless Tobacco Never Used       Alcohol Consumption:  Social History     Substance and Sexual Activity   Alcohol Use No       Depression Screen:   PHQ-2/PHQ-9 Depression Screening 8/29/2019   Little interest or pleasure in doing things 0   Feeling down, depressed, or hopeless 0   Total Score 0       Fall Risk Screen:  STEADI Fall Risk Assessment has not been completed.    Health Habits and Functional and Cognitive Screening:  Functional & Cognitive Status 8/29/2019   Do you have difficulty preparing food and eating? No   Do you have difficulty bathing yourself, getting dressed or grooming yourself? No   Do you have difficulty using the toilet? No   Do you have difficulty moving around from place to place? No   Do you have trouble with steps or getting out of a bed or a chair? No   Current Diet Limited Junk Food   Dental Exam Not up to date   Eye Exam Up to date   Exercise (times per week) 7 times per week   Current Exercise Activities Include Stationary Bicycling/Spin Class   Do you need help using the phone?  No   Are you deaf or do you have serious difficulty hearing?  No   Do you need help with transportation? Yes   Do you need help shopping? No   Do you need help preparing meals?  No   Do you need help with housework?  No   Do you  need help with laundry? No   Do you need help taking your medications? No   Do you need help managing money? No   Do you ever drive or ride in a car without wearing a seat belt? Yes   Have you felt unusual stress, anger or loneliness in the last month? -   Who do you live with? -   If you need help, do you have trouble finding someone available to you? -   Have you been bothered in the last four weeks by sexual problems? -   Do you have difficulty concentrating, remembering or making decisions? -         Does the patient have evidence of cognitive impairment? No    Asprin use counseling:Taking ASA appropriately as indicated    Age-appropriate Screening Schedule:  Refer to the list below for future screening recommendations based on patient's age, sex and/or medical conditions. Orders for these recommended tests are listed in the plan section. The patient has been provided with a written plan.    Health Maintenance   Topic Date Due   • PNEUMOCOCCAL VACCINE (19-64 MEDIUM RISK) (1 of 1 - PPSV23) 01/04/1980   • TDAP/TD VACCINES (1 - Tdap) 01/04/1980   • ZOSTER VACCINE (1 of 2) 01/04/2011   • DIABETIC FOOT EXAM  08/27/2019   • INFLUENZA VACCINE  08/01/2019   • HEMOGLOBIN A1C  12/27/2019   • DIABETIC EYE EXAM  08/05/2020   • URINE MICROALBUMIN  08/29/2020   • COLONOSCOPY  11/27/2028          The following portions of the patient's history were reviewed and updated as appropriate: allergies, current medications, past family history, past medical history, past social history, past surgical history and problem list.    Outpatient Medications Prior to Visit   Medication Sig Dispense Refill   • ACCU-CHEK SOFTCLIX LANCETS lancets 1 each.     • betaxolol (BETOPTIC-S) 0.25 % ophthalmic suspension 1 drop 2 (Two) Times a Day.     • fluticasone (FLONASE) 50 MCG/ACT nasal spray 2 sprays into the nostril(s) as directed by provider Daily. 9.9 mL 1   • glimepiride (AMARYL) 4 MG tablet TAKE 1 TABLET BY MOUTH IN THE MORNING BEFORE  BREAKFAST 90 tablet 1   • glucose blood test strip Test blood sugar three times daily Dx E11.9 100 each 12   • latanoprost (XALATAN) 0.005 % ophthalmic solution      • levothyroxine (SYNTHROID, LEVOTHROID) 150 MCG tablet TAKE 1 TABLET BY MOUTH ONCE DAILY 90 tablet 3   • metFORMIN (GLUCOPHAGE) 1000 MG tablet TAKE 1 TABLET BY MOUTH TWICE DAILY WITH FOOD 180 tablet 1   • metoprolol tartrate (LOPRESSOR) 25 MG tablet TAKE 1 TABLET BY MOUTH TWICE DAILY **MAY BREAK IN HALF IF NEEDED** 180 tablet 3   • Omega-3 Fatty Acids (OMEGA 3 PO) Take  by mouth.     • probiotic (CULTURELLE) capsule capsule Take 1 capsule by mouth Daily.     • trimethoprim-polymyxin b (POLYTRIM) 31400-5.1 UNIT/ML-% ophthalmic solution      • doxycycline (VIBRAMYCIN) 100 MG capsule Take 1 capsule by mouth 2 (Two) Times a Day. 20 capsule 0     No facility-administered medications prior to visit.        Patient Active Problem List   Diagnosis   • Back pain   • Essential hypertension   • Type 2 diabetes mellitus without complication, without long-term current use of insulin (CMS/[facility])   • Morbid obesity (CMS/[facility])   • Severe obstructive sleep apnea       Advanced Care Planning:  Patient does not have an advance directive - information provided to the patient today    Review of Systems   Constitutional: Negative for appetite change, diaphoresis, fatigue and unexpected weight change.   Eyes: Negative for visual disturbance.   Respiratory: Negative for cough, chest tightness and shortness of breath.    Cardiovascular: Negative for chest pain, palpitations and leg swelling.   Gastrointestinal: Negative for diarrhea, nausea and vomiting.   Endocrine: Negative for polydipsia, polyphagia and polyuria.   Skin: Negative for color change and rash.   Neurological: Negative for dizziness, syncope, weakness, light-headedness, numbness and headaches.       Compared to one year ago, the patient feels his physical health is better.  Compared to one year ago, the patient  "feels his mental health is better.    Reviewed chart for potential of high risk medication in the elderly: yes  Reviewed chart for potential of harmful drug interactions in the elderly:yes    Objective         Vitals:    08/29/19 0951   BP: 122/84   Pulse: 78   Temp: 97 °F (36.1 °C)   SpO2: 99%   Weight: (!) 139 kg (306 lb)   Height: 170.2 cm (67.01\")       Body mass index is 47.91 kg/m².  Discussed the patient's BMI with him. The BMI is below average; BMI management plan is completed.    Physical Exam   Constitutional: He appears well-developed and well-nourished.   Neck: Neck supple. No JVD present. No thyromegaly present.   Cardiovascular: Normal rate, regular rhythm, normal heart sounds, intact distal pulses and normal pulses.   No murmur heard.  Pulmonary/Chest: Effort normal and breath sounds normal. No respiratory distress.   Abdominal: Soft. Bowel sounds are normal. There is no hepatosplenomegaly. There is no tenderness.   Musculoskeletal: He exhibits no edema.   Lymphadenopathy:     He has no cervical adenopathy.   Neurological: No sensory deficit.   Skin: Skin is warm and dry. He is not diaphoretic.   Nursing note and vitals reviewed.      Lab Results   Component Value Date    TRIG 172 (H) 06/27/2019    HDL 40 06/27/2019     (H) 06/27/2019    VLDL 34.4 06/27/2019    HGBA1C 7.3 06/27/2019        Assessment/Plan   Medicare Risks and Personalized Health Plan  CMS Preventative Services Quick Reference  Advance Directive Discussion  Diabetic Lab Screening   Fall Risk    The above risks/problems have been discussed with the patient.  Pertinent information has been shared with the patient in the After Visit Summary.  Follow up plans and orders are seen below in the Assessment/Plan Section.    Diagnoses and all orders for this visit:    1. Medicare annual wellness visit, initial (Primary)  -     MicroAlbumin, Urine, Random - Urine, Clean Catch; Future    2. Routine general medical examination at a [facility] care facility  -     Cancel: Comprehensive Metabolic Panel; Future  -     Hemoglobin A1c; Future  -     Cancel: Lipid Panel; Future  -     TSH; Future    Other orders  -     Cancel: PSA Screen; Future    Preventive medicine discussed, diet, exercise, healthy living, discussed losing weight exercise  Follow Up:  No Follow-up on file.     An After Visit Summary and PPPS were given to the patient.

## 2019-09-27 ENCOUNTER — OFFICE VISIT (OUTPATIENT)
Dept: FAMILY MEDICINE CLINIC | Facility: CLINIC | Age: 59
End: 2019-09-27

## 2019-09-27 VITALS
DIASTOLIC BLOOD PRESSURE: 86 MMHG | BODY MASS INDEX: 48.09 KG/M2 | OXYGEN SATURATION: 97 % | RESPIRATION RATE: 18 BRPM | WEIGHT: 306.4 LBS | HEART RATE: 79 BPM | SYSTOLIC BLOOD PRESSURE: 130 MMHG | HEIGHT: 67 IN

## 2019-09-27 DIAGNOSIS — E11.9 TYPE 2 DIABETES MELLITUS WITHOUT COMPLICATION, WITHOUT LONG-TERM CURRENT USE OF INSULIN (HCC): Primary | ICD-10-CM

## 2019-09-27 LAB
GLUCOSE BLDC GLUCOMTR-MCNC: 156 MG/DL (ref 70–130)
HBA1C MFR BLD: 6.9 %

## 2019-09-27 PROCEDURE — 82962 GLUCOSE BLOOD TEST: CPT | Performed by: PHYSICIAN ASSISTANT

## 2019-09-27 PROCEDURE — 99213 OFFICE O/P EST LOW 20 MIN: CPT | Performed by: PHYSICIAN ASSISTANT

## 2019-09-27 PROCEDURE — 83036 HEMOGLOBIN GLYCOSYLATED A1C: CPT | Performed by: PHYSICIAN ASSISTANT

## 2019-09-27 RX ORDER — SIMVASTATIN 10 MG
10 TABLET ORAL DAILY
Qty: 30 TABLET | Refills: 11 | Status: SHIPPED | OUTPATIENT
Start: 2019-09-27 | End: 2020-01-20 | Stop reason: SDUPTHER

## 2019-09-27 NOTE — PROGRESS NOTES
Subjective   Chandler Mar is a 59 y.o. male  Diabetes ( and A1C 6.9%)      History of Present Illness  Patient is a pleasant 59-year-old white male who comes in complaining of diabetes mellitus patient doing well no shortness of breath no chest pain feeling well diabetes medicine is working well A1c is 6.9 patient still has not been started on a statin with diabetes his last LDL was 106  The following portions of the patient's history were reviewed and updated as appropriate: allergies, current medications, past social history and problem list    Review of Systems   Constitutional: Negative for appetite change, diaphoresis, fatigue and unexpected weight change.   Eyes: Negative for visual disturbance.   Respiratory: Negative for chest tightness and shortness of breath.    Cardiovascular: Negative for chest pain, palpitations and leg swelling.   Gastrointestinal: Negative for diarrhea, nausea and vomiting.   Endocrine: Negative for polydipsia, polyphagia and polyuria.   Skin: Negative for color change.   Neurological: Negative for dizziness, weakness, light-headedness and numbness.       Objective     Vitals:    09/27/19 1324   BP: 130/86   Pulse: 79   Resp: 18   SpO2: 97%       Physical Exam   Constitutional: He appears well-developed and well-nourished.   Neck: Neck supple. No JVD present. No thyromegaly present.   Cardiovascular: Normal rate, regular rhythm, normal heart sounds and intact distal pulses.   Pulmonary/Chest: Effort normal and breath sounds normal.   Abdominal: Soft. Bowel sounds are normal.   Musculoskeletal: He exhibits no edema.   Lymphadenopathy:     He has no cervical adenopathy.   Neurological: No sensory deficit.   Skin: Skin is warm and dry. He is not diaphoretic.   Nursing note and vitals reviewed.      Assessment/Plan     Diagnoses and all orders for this visit:    Type 2 diabetes mellitus without complication, without long-term current use of insulin (CMS/[facility])  -     POC  Glycosylated Hemoglobin (Hb A1C)  -     POCT Glucose  -     simvastatin (ZOCOR) 10 MG tablet; Take 1 tablet by mouth Daily.    Follow-up as needed

## 2019-11-11 ENCOUNTER — TELEPHONE (OUTPATIENT)
Dept: FAMILY MEDICINE CLINIC | Facility: CLINIC | Age: 59
End: 2019-11-11

## 2019-11-11 DIAGNOSIS — E13.69 OTHER SPECIFIED DIABETES MELLITUS WITH OTHER SPECIFIED COMPLICATION, UNSPECIFIED WHETHER LONG TERM INSULIN USE (HCC): Primary | ICD-10-CM

## 2020-01-20 ENCOUNTER — TELEPHONE (OUTPATIENT)
Dept: FAMILY MEDICINE CLINIC | Facility: CLINIC | Age: 60
End: 2020-01-20

## 2020-01-20 DIAGNOSIS — E11.9 TYPE 2 DIABETES MELLITUS WITHOUT COMPLICATION, WITHOUT LONG-TERM CURRENT USE OF INSULIN (HCC): ICD-10-CM

## 2020-01-20 RX ORDER — LANCETS
EACH MISCELLANEOUS
Qty: 300 EACH | Refills: 3 | Status: SHIPPED | OUTPATIENT
Start: 2020-01-20 | End: 2020-10-26

## 2020-01-20 RX ORDER — SIMVASTATIN 10 MG
10 TABLET ORAL DAILY
Qty: 10 TABLET | Refills: 0 | Status: SHIPPED | OUTPATIENT
Start: 2020-01-20 | End: 2020-01-27 | Stop reason: SDUPTHER

## 2020-01-20 RX ORDER — ISOPROPYL ALCOHOL 0.75 G/1
SWAB TOPICAL
Qty: 300 EACH | Refills: 3 | Status: SHIPPED | OUTPATIENT
Start: 2020-01-20 | End: 2020-10-26

## 2020-01-20 NOTE — TELEPHONE ENCOUNTER
Patient is calling requesting a refill on his cholesterol medication. Patient is only needing about 10 days worth of medication because typically the patient uses a mail delivery for medications but will be out of the medication before he will receive the mail order. Please advise.    Simvastatin (ZOCOR) 10 MG tablet    Confirmed pharmacy [facility] on [facility]    Patient can be reached at  if needed

## 2020-01-22 DIAGNOSIS — E03.9 HYPOTHYROIDISM, UNSPECIFIED TYPE: ICD-10-CM

## 2020-01-22 RX ORDER — BLOOD-GLUCOSE METER
KIT MISCELLANEOUS
Qty: 1 EACH | Refills: 0 | Status: SHIPPED | OUTPATIENT
Start: 2020-01-22

## 2020-01-22 RX ORDER — LEVOTHYROXINE SODIUM 0.15 MG/1
150 TABLET ORAL DAILY
Qty: 90 TABLET | Refills: 3 | Status: SHIPPED | OUTPATIENT
Start: 2020-01-22 | End: 2020-10-26

## 2020-01-27 DIAGNOSIS — I10 ESSENTIAL HYPERTENSION: ICD-10-CM

## 2020-01-27 DIAGNOSIS — E11.9 TYPE 2 DIABETES MELLITUS WITHOUT COMPLICATION, WITHOUT LONG-TERM CURRENT USE OF INSULIN (HCC): ICD-10-CM

## 2020-01-27 RX ORDER — SIMVASTATIN 10 MG
10 TABLET ORAL DAILY
Qty: 90 TABLET | Refills: 3 | Status: SHIPPED | OUTPATIENT
Start: 2020-01-27 | End: 2020-10-26

## 2020-01-27 NOTE — TELEPHONE ENCOUNTER
Patient called in to request RX refill of simvastatin (ZOCOR) 10 MG tablet    metFORMIN (GLUCOPHAGE) 1000 MG tablet    Pharmacy confirmed      Please Advise

## 2020-01-28 ENCOUNTER — TELEPHONE (OUTPATIENT)
Dept: FAMILY MEDICINE CLINIC | Facility: CLINIC | Age: 60
End: 2020-01-28

## 2020-01-28 NOTE — TELEPHONE ENCOUNTER
Patient called and stated that he needs his simvastatin (ZOCOR) 10 MG tablet Rx sent to [facility] pharmacy mail service.    He also requested to speak with someone in the office about his insurance change and how that will affect this.    He can be contacted at [phone].

## 2020-01-28 NOTE — TELEPHONE ENCOUNTER
Patient notified that simvastatin was sent to [facility] yesterday along with two other medications. I also informed him of the medications that were refilled Jan 20 and Jan 22.

## 2020-02-07 DIAGNOSIS — E11.9 TYPE 2 DIABETES MELLITUS WITHOUT COMPLICATION, WITHOUT LONG-TERM CURRENT USE OF INSULIN (HCC): ICD-10-CM

## 2020-02-07 RX ORDER — GLIMEPIRIDE 4 MG/1
TABLET ORAL
Qty: 90 TABLET | Refills: 3 | Status: SHIPPED | OUTPATIENT
Start: 2020-02-07 | End: 2020-10-26

## 2020-02-07 RX ORDER — BLOOD-GLUCOSE CONTROL, NORMAL
EACH MISCELLANEOUS
Qty: 1 EACH | Refills: 11 | Status: SHIPPED | OUTPATIENT
Start: 2020-02-07 | End: 2021-12-20 | Stop reason: SDUPTHER

## 2020-03-26 ENCOUNTER — OFFICE VISIT (OUTPATIENT)
Dept: FAMILY MEDICINE CLINIC | Facility: CLINIC | Age: 60
End: 2020-03-26

## 2020-03-26 VITALS
TEMPERATURE: 98 F | BODY MASS INDEX: 48.78 KG/M2 | OXYGEN SATURATION: 98 % | DIASTOLIC BLOOD PRESSURE: 84 MMHG | HEIGHT: 67 IN | SYSTOLIC BLOOD PRESSURE: 126 MMHG | RESPIRATION RATE: 18 BRPM | HEART RATE: 84 BPM | WEIGHT: 310.8 LBS

## 2020-03-26 DIAGNOSIS — E66.01 MORBID OBESITY (HCC): ICD-10-CM

## 2020-03-26 DIAGNOSIS — E11.65 UNCONTROLLED TYPE 2 DIABETES MELLITUS WITH HYPERGLYCEMIA (HCC): Primary | ICD-10-CM

## 2020-03-26 LAB
GLUCOSE BLDC GLUCOMTR-MCNC: 255 MG/DL (ref 70–130)
HBA1C MFR BLD: 7.6 %

## 2020-03-26 PROCEDURE — 83036 HEMOGLOBIN GLYCOSYLATED A1C: CPT | Performed by: PHYSICIAN ASSISTANT

## 2020-03-26 PROCEDURE — 99213 OFFICE O/P EST LOW 20 MIN: CPT | Performed by: PHYSICIAN ASSISTANT

## 2020-03-26 PROCEDURE — 82962 GLUCOSE BLOOD TEST: CPT | Performed by: PHYSICIAN ASSISTANT

## 2020-03-26 NOTE — PROGRESS NOTES
Subjective   Chandler aMr is a 60 y.o. male  Diabetes (. A1C 7.6%-was 6.9% in Sept)      History of Present Illness  Patient is a pleasant 60-year-old white male comes in follow-up type 2 diabetes he states has not been watching his diet as closely as in the past his A1c today was 7.6 resting blood sugar at 255 patient states he is not been taking care of self as much not been watching his diet has gained some weight BMIs 48.67 weight 310  The following portions of the patient's history were reviewed and updated as appropriate: allergies, current medications, past social history and problem list    Review of Systems   Constitutional: Negative for appetite change, diaphoresis, fatigue and unexpected weight change.   Eyes: Negative for visual disturbance.   Respiratory: Negative for chest tightness and shortness of breath.    Cardiovascular: Negative for chest pain, palpitations and leg swelling.   Gastrointestinal: Negative for diarrhea, nausea and vomiting.   Endocrine: Negative for polydipsia, polyphagia and polyuria.   Skin: Negative for color change.   Neurological: Negative for dizziness, weakness, light-headedness and numbness.       Objective     Vitals:    03/26/20 1256   BP: 126/84   Pulse: 84   Resp: 18   Temp: 98 °F (36.7 °C)   SpO2: 98%       Physical Exam   Constitutional: He appears well-developed and well-nourished.   Neck: Neck supple. No JVD present. No thyromegaly present.   Cardiovascular: Normal rate, regular rhythm, normal heart sounds and intact distal pulses.   Pulmonary/Chest: Effort normal and breath sounds normal.   Abdominal: Soft. Bowel sounds are normal.   Musculoskeletal: He exhibits no edema.   Lymphadenopathy:     He has no cervical adenopathy.   Neurological: No sensory deficit.   Skin: Skin is warm and dry. He is not diaphoretic.   Nursing note and vitals reviewed.      Assessment/Plan     Chandler was seen today for diabetes.    Diagnoses and all orders for this  visit:    Uncontrolled type 2 diabetes mellitus with hyperglycemia (CMS/[facility])  -     POC Glycosylated Hemoglobin (Hb A1C)  -     POCT Glucose    Morbid obesity (CMS/[facility])    #1 Long discussion with patient about better ways to lower A1c and blood sugar low-fat low-carb diet exercise 3 5 times weekly 30 minutes follow-up if no better discussed ways to improve overall health discussed the coronavirus prevention techniques such as washing hands social dyspnea etc.

## 2020-08-17 ENCOUNTER — OFFICE VISIT (OUTPATIENT)
Dept: SLEEP MEDICINE | Facility: HOSPITAL | Age: 60
End: 2020-08-17

## 2020-08-17 VITALS
HEART RATE: 89 BPM | BODY MASS INDEX: 48.03 KG/M2 | HEIGHT: 67 IN | DIASTOLIC BLOOD PRESSURE: 77 MMHG | OXYGEN SATURATION: 97 % | WEIGHT: 306 LBS | SYSTOLIC BLOOD PRESSURE: 158 MMHG

## 2020-08-17 DIAGNOSIS — G47.33 OSA (OBSTRUCTIVE SLEEP APNEA): Primary | ICD-10-CM

## 2020-08-17 PROCEDURE — 99212 OFFICE O/P EST SF 10 MIN: CPT | Performed by: NURSE PRACTITIONER

## 2020-08-17 NOTE — PROGRESS NOTES
Chief Complaint:   Chief Complaint   Patient presents with   • Follow-up       HPI:    Chandler Mar is a 60 y.o. male here for follow-up of sleep apnea.  Patient was last seen 8/16/2019.  Patient states he is doing well with CPAP therapy.  Patient is sleeping 6 to 9 hours nightly and does feel refreshed upon awakening.  Patient will go to sleep within 5 minutes.  Patient will get up x1 in the night and does not have difficulty going back to sleep.  Patient has an Munich score of 3/24.  Patient has no concerns or complaints with CPAP therapy and does wish to continue.  Past Medical History:   Diagnosis Date   • Broken ankle 2011    Left   • Broken ankle 2015    Right   • Diabetes mellitus (CMS/[facility])    • Hypertension    • Hypothyroidism            Current medications are:   Current Outpatient Medications:   •  ACCU-CHEK SOFTCLIX LANCETS lancets, Use to check blood sugar three times daily. Dx: E11.9, Disp: 300 each, Rfl: 3  •  Alcohol Swabs (B-D SINGLE USE SWABS REGULAR) pads, Use to clean finger prior to checking blood sugar three times daily. Dx E11.9, Disp: 300 each, Rfl: 3  •  betaxolol (BETOPTIC-S) 0.25 % ophthalmic suspension, 1 drop 2 (Two) Times a Day., Disp: , Rfl:   •  Blood Glucose Calibration (GLUCOSE CONTROL) solution, Use to calibrate testing supplies. Dx E11.65, Disp: 1 each, Rfl: 11  •  fluticasone (FLONASE) 50 MCG/ACT nasal spray, 2 sprays into the nostril(s) as directed by provider Daily., Disp: 9.9 mL, Rfl: 1  •  glimepiride (AMARYL) 4 MG tablet, TAKE 1 TABLET BY MOUTH IN THE MORNING BEFORE BREAKFAST, Disp: 90 tablet, Rfl: 3  •  glucose blood test strip, Test blood sugar three times daily Dx E11.9, Disp: 300 each, Rfl: 3  •  glucose monitor monitoring kit, Use to check blood sugar three times daily. Dx E11.9, Disp: 1 each, Rfl: 0  •  latanoprost (XALATAN) 0.005 % ophthalmic solution, , Disp: , Rfl:   •  levothyroxine (SYNTHROID, LEVOTHROID) 150 MCG tablet, Take 1 tablet by mouth Daily., Disp:  90 tablet, Rfl: 3  •  metFORMIN (GLUCOPHAGE) 1000 MG tablet, TAKE 1 TABLET BY MOUTH TWICE DAILY WITH FOOD, Disp: 180 tablet, Rfl: 1  •  metFORMIN (GLUCOPHAGE) 1000 MG tablet, Take 1 tablet by mouth 2 (Two) Times a Day With Meals., Disp: 180 tablet, Rfl: 3  •  metoprolol tartrate (LOPRESSOR) 25 MG tablet, Take one tablet by mouth twice daily. May break in half if needed., Disp: 180 tablet, Rfl: 3  •  Omega-3 Fatty Acids (OMEGA 3 PO), Take  by mouth., Disp: , Rfl:   •  probiotic (CULTURELLE) capsule capsule, Take 1 capsule by mouth Daily., Disp: , Rfl:   •  simvastatin (ZOCOR) 10 MG tablet, Take 1 tablet by mouth Daily., Disp: 90 tablet, Rfl: 3  •  trimethoprim-polymyxin b (POLYTRIM) 56937-0.1 UNIT/ML-% ophthalmic solution, , Disp: , Rfl: .      The patient's relevant past medical, surgical, family and social history were reviewed and updated in Epic as appropriate.       Review of Systems   HENT: Positive for hearing loss.    Eyes: Positive for visual disturbance.   Respiratory: Positive for apnea.    Gastrointestinal: Positive for constipation and diarrhea.   Endocrine: Positive for cold intolerance and heat intolerance.   Musculoskeletal: Positive for arthralgias, gait problem and joint swelling.   Psychiatric/Behavioral: Positive for sleep disturbance.   All other systems reviewed and are negative.        Objective:    Physical Exam   Constitutional: He is oriented to person, place, and time. He appears well-developed and well-nourished.   HENT:   Head: Normocephalic and atraumatic.   Class 4 airway   Eyes: Conjunctivae are normal.   Neck: No tracheal deviation present.   Cardiovascular: Normal rate.   Pulmonary/Chest: Effort normal.   Neurological: He is alert and oriented to person, place, and time.   Skin: Skin is warm and dry.   Psychiatric: He has a normal mood and affect. His behavior is normal. Judgment and thought content normal.   Vitals reviewed.    90/90 days of use.  Greater than 4-hour use 100% 90%  pressure 15 AHI of 1.2 settings 14 to 20 cm H2O.  Download reviewed with patient.    ASSESSMENT/PLAN    Chandler was seen today for follow-up.    Diagnoses and all orders for this visit:    LUISANA (obstructive sleep apnea)  -     CPAP Therapy            1. Counseled patient regarding multimodal approach with healthy nutrition, healthy sleep, regular physical activity, social activities, counseling, and medications. Encouraged to practice lateral sleep position. Avoid alcohol and sedatives close to bedtime.  2. Refill supplies x1 year.  Return to clinic 1 year or sooner symptoms warrant.    I have reviewed the results of my evaluation and impression and discussed my recommendations in detail with the patient.      Signed by  ANTHONY Morris    August 17, 2020      CC: Codey Josue PA          No ref. provider found

## 2020-09-25 ENCOUNTER — OFFICE VISIT (OUTPATIENT)
Dept: FAMILY MEDICINE CLINIC | Facility: CLINIC | Age: 60
End: 2020-09-25

## 2020-09-25 ENCOUNTER — LAB REQUISITION (OUTPATIENT)
Dept: LAB | Facility: HOSPITAL | Age: 60
End: 2020-09-25

## 2020-09-25 VITALS
TEMPERATURE: 97.5 F | SYSTOLIC BLOOD PRESSURE: 132 MMHG | HEART RATE: 81 BPM | DIASTOLIC BLOOD PRESSURE: 80 MMHG | WEIGHT: 306.4 LBS | BODY MASS INDEX: 48.09 KG/M2 | OXYGEN SATURATION: 98 % | HEIGHT: 67 IN | RESPIRATION RATE: 18 BRPM

## 2020-09-25 DIAGNOSIS — Z12.5 SCREENING FOR PROSTATE CANCER: ICD-10-CM

## 2020-09-25 DIAGNOSIS — Z00.00 ROUTINE GENERAL MEDICAL EXAMINATION AT A HEALTH CARE FACILITY: ICD-10-CM

## 2020-09-25 DIAGNOSIS — Z00.00 MEDICARE ANNUAL WELLNESS VISIT, SUBSEQUENT: ICD-10-CM

## 2020-09-25 DIAGNOSIS — Z00.00 ROUTINE GENERAL MEDICAL EXAMINATION AT A HEALTH CARE FACILITY: Primary | ICD-10-CM

## 2020-09-25 PROCEDURE — G0439 PPPS, SUBSEQ VISIT: HCPCS | Performed by: PHYSICIAN ASSISTANT

## 2020-09-25 PROCEDURE — 93000 ELECTROCARDIOGRAM COMPLETE: CPT | Performed by: PHYSICIAN ASSISTANT

## 2020-09-25 PROCEDURE — 36415 COLL VENOUS BLD VENIPUNCTURE: CPT

## 2020-09-25 PROCEDURE — 96160 PT-FOCUSED HLTH RISK ASSMT: CPT | Performed by: PHYSICIAN ASSISTANT

## 2020-09-25 PROCEDURE — 99396 PREV VISIT EST AGE 40-64: CPT | Performed by: PHYSICIAN ASSISTANT

## 2020-09-25 NOTE — PROGRESS NOTES
The ABCs of the Annual Wellness Visit  Subsequent Medicare Wellness Visit    Chief Complaint   Patient presents with   • Medicare Wellness-subsequent     Not fasting. UTD on colonoscopy. Declines vaccines.    • Diabetes      this morning.        Subjective   History of Present Illness:  Chandler Mar is a 60 y.o. male who presents for a Subsequent Medicare Wellness Visit.    HEALTH RISK ASSESSMENT    Recent Hospitalizations:  No hospitalization(s) within the last year.    Current Medical Providers:  Patient Care Team:  Codey Josue PA as PCP - General (Family Medicine)  Favian Mojica MD as Surgeon (Neurosurgery)  Brandon Saleh MD as Consulting Physician (Anesthesiology)    Smoking Status:  Social History     Tobacco Use   Smoking Status Former Smoker   Smokeless Tobacco Never Used       Alcohol Consumption:  Social History     Substance and Sexual Activity   Alcohol Use No       Depression Screen:   PHQ-2/PHQ-9 Depression Screening 9/25/2020   Little interest or pleasure in doing things 0   Feeling down, depressed, or hopeless 0   Total Score 0       Fall Risk Screen:  STEADI Fall Risk Assessment has not been completed.    Health Habits and Functional and Cognitive Screening:  Functional & Cognitive Status 9/25/2020   Do you have difficulty preparing food and eating? No   Do you have difficulty bathing yourself, getting dressed or grooming yourself? No   Do you have difficulty using the toilet? No   Do you have difficulty moving around from place to place? No   Do you have trouble with steps or getting out of a bed or a chair? No   Current Diet Diabetic Diet   Dental Exam Up to date   Eye Exam Unknown   Exercise (times per week) 3 times per week   Current Exercise Activities Include Walking   Do you need help using the phone?  No   Are you deaf or do you have serious difficulty hearing?  No   Do you need help with transportation? No   Do you need help shopping? No   Do you need help preparing  meals?  No   Do you need help with housework?  No   Do you need help with laundry? No   Do you need help taking your medications? No   Do you need help managing money? No   Do you ever drive or ride in a car without wearing a seat belt? No   Have you felt unusual stress, anger or loneliness in the last month? No   Who do you live with? Alone   If you need help, do you have trouble finding someone available to you? No   Have you been bothered in the last four weeks by sexual problems? No   Do you have difficulty concentrating, remembering or making decisions? No         Does the patient have evidence of cognitive impairment? No    Asprin use counseling:Taking ASA appropriately as indicated    Age-appropriate Screening Schedule:  Refer to the list below for future screening recommendations based on patient's age, sex and/or medical conditions. Orders for these recommended tests are listed in the plan section. The patient has been provided with a written plan.    Health Maintenance   Topic Date Due   • URINE MICROALBUMIN  08/29/2020   • TDAP/TD VACCINES (1 - Tdap) 09/25/2020 (Originally 1/4/1979)   • ZOSTER VACCINE (1 of 2) 09/25/2020 (Originally 1/4/2011)   • INFLUENZA VACCINE  09/25/2021 (Originally 8/1/2020)   • HEMOGLOBIN A1C  09/26/2020   • DIABETIC EYE EXAM  08/20/2021   • COLONOSCOPY  11/27/2028   • DIABETIC FOOT EXAM  Discontinued          The following portions of the patient's history were reviewed and updated as appropriate: allergies, current medications, past family history, past medical history, past social history, past surgical history and problem list.    Outpatient Medications Prior to Visit   Medication Sig Dispense Refill   • ACCU-CHEK SOFTCLIX LANCETS lancets Use to check blood sugar three times daily. Dx: E11.9 300 each 3   • Alcohol Swabs (B-D SINGLE USE SWABS REGULAR) pads Use to clean finger prior to checking blood sugar three times daily. Dx E11.9 300 each 3   • betaxolol (BETOPTIC-S) 0.25 %  ophthalmic suspension 1 drop 2 (Two) Times a Day.     • Blood Glucose Calibration (GLUCOSE CONTROL) solution Use to calibrate testing supplies. Dx E11.65 1 each 11   • fluticasone (FLONASE) 50 MCG/ACT nasal spray 2 sprays into the nostril(s) as directed by provider Daily. 9.9 mL 1   • glimepiride (AMARYL) 4 MG tablet TAKE 1 TABLET BY MOUTH IN THE MORNING BEFORE BREAKFAST 90 tablet 3   • glucose blood test strip Test blood sugar three times daily Dx E11.9 300 each 3   • glucose monitor monitoring kit Use to check blood sugar three times daily. Dx E11.9 1 each 0   • latanoprost (XALATAN) 0.005 % ophthalmic solution      • levothyroxine (SYNTHROID, LEVOTHROID) 150 MCG tablet Take 1 tablet by mouth Daily. 90 tablet 3   • metFORMIN (GLUCOPHAGE) 1000 MG tablet TAKE 1 TABLET BY MOUTH TWICE DAILY WITH FOOD 180 tablet 1   • metFORMIN (GLUCOPHAGE) 1000 MG tablet Take 1 tablet by mouth 2 (Two) Times a Day With Meals. 180 tablet 3   • metoprolol tartrate (LOPRESSOR) 25 MG tablet Take one tablet by mouth twice daily. May break in half if needed. 180 tablet 3   • simvastatin (ZOCOR) 10 MG tablet Take 1 tablet by mouth Daily. 90 tablet 3   • trimethoprim-polymyxin b (POLYTRIM) 95887-0.1 UNIT/ML-% ophthalmic solution      • Omega-3 Fatty Acids (OMEGA 3 PO) Take  by mouth.     • probiotic (CULTURELLE) capsule capsule Take 1 capsule by mouth Daily.       No facility-administered medications prior to visit.        Patient Active Problem List   Diagnosis   • Back pain   • Essential hypertension   • Type 2 diabetes mellitus without complication, without long-term current use of insulin (CMS/[facility])   • Morbid obesity (CMS/[facility])   • Severe obstructive sleep apnea       Advanced Care Planning:  ACP discussion was held with the patient during this visit. Patient has an advance directive (not in EMR), copy requested.    Review of Systems   Constitutional: Negative.    HENT: Negative.    Eyes: Negative.    Respiratory: Negative.   "  Cardiovascular: Negative.    Gastrointestinal: Negative.    Endocrine: Negative.    Genitourinary: Negative.    Musculoskeletal: Negative.    Skin: Negative.    Allergic/Immunologic: Negative.    Neurological: Negative.    Hematological: Negative.    Psychiatric/Behavioral: Negative.    All other systems reviewed and are negative.      Compared to one year ago, the patient feels his physical health is better.  Compared to one year ago, the patient feels his mental health is better.    Reviewed chart for potential of high risk medication in the elderly: yes  Reviewed chart for potential of harmful drug interactions in the elderly:yes    Objective         Vitals:    09/25/20 1120   BP: 132/80   Pulse: 81   Resp: 18   Temp: 97.5 °F (36.4 °C)   TempSrc: Infrared   SpO2: 98%   Weight: (!) 139 kg (306 lb 6.4 oz)   Height: 170.2 cm (67.01\")   PainSc: 0-No pain       Body mass index is 47.98 kg/m².  Discussed the patient's BMI with him. The BMI is in the acceptable range.    Physical Exam  Constitutional:       Appearance: He is well-developed.   HENT:      Head: Normocephalic and atraumatic.      Right Ear: External ear normal.      Left Ear: External ear normal.      Nose: Nose normal.   Eyes:      Conjunctiva/sclera: Conjunctivae normal.      Pupils: Pupils are equal, round, and reactive to light.   Neck:      Musculoskeletal: Normal range of motion and neck supple.      Thyroid: No thyromegaly.   Cardiovascular:      Rate and Rhythm: Normal rate and regular rhythm.      Heart sounds: Normal heart sounds. No murmur.   Pulmonary:      Effort: Pulmonary effort is normal.      Breath sounds: Normal breath sounds.   Abdominal:      General: Bowel sounds are normal.      Palpations: Abdomen is soft. There is no mass.      Tenderness: There is no abdominal tenderness.   Genitourinary:     Penis: Normal.       Prostate: Normal.      Rectum: Normal.   Musculoskeletal: Normal range of motion.   Skin:     General: Skin is warm " and dry.   Neurological:      Mental Status: He is alert and oriented to person, place, and time.      Cranial Nerves: No cranial nerve deficit.      Deep Tendon Reflexes: Reflexes are normal and symmetric.         ECG 12 Lead    Date/Time: 9/25/2020 12:54 PM  Performed by: Codey Josue PA  Authorized by: Codey Josue PA   Comparison: not compared with previous ECG   Rhythm: sinus rhythm  ST Segments: ST segments normal  T Waves: T waves normal    Clinical impression: non-specific ECG                Assessment/Plan   Medicare Risks and Personalized Health Plan  CMS Preventative Services Quick Reference  Advance Directive Discussion  Obesity/Overweight     The above risks/problems have been discussed with the patient.  Pertinent information has been shared with the patient in the After Visit Summary.  Follow up plans and orders are seen below in the Assessment/Plan Section.    Diagnoses and all orders for this visit:    1. Routine general medical examination at a health care facility (Primary)    2. Medicare annual wellness visit, subsequent  -     Comprehensive Metabolic Panel; Future  -     Hemoglobin A1c; Future  -     Lipid Panel; Future  -     MicroAlbumin, Urine, Random - Urine, Clean Catch; Future  -     MicroAlbumin, Urine, Random - Urine, Clean Catch  -     Lipid Panel  -     Hemoglobin A1c  -     Comprehensive Metabolic Panel    Preventive medicine discussed discussed importance of losing weight exercise healthy diet etc.  Follow Up:  No follow-ups on file.     An After Visit Summary and PPPS were given to the patient.

## 2020-09-26 LAB
ALBUMIN SERPL-MCNC: 5.1 G/DL (ref 3.5–5.2)
ALBUMIN/GLOB SERPL: 2.1 G/DL
ALP SERPL-CCNC: 61 U/L (ref 39–117)
ALT SERPL-CCNC: 34 U/L (ref 1–41)
AST SERPL-CCNC: 25 U/L (ref 1–40)
BILIRUB SERPL-MCNC: 0.3 MG/DL (ref 0–1.2)
BUN SERPL-MCNC: 11 MG/DL (ref 6–20)
BUN/CREAT SERPL: 12.9 (ref 7–25)
CALCIUM SERPL-MCNC: 9.8 MG/DL (ref 8.6–10.5)
CHLORIDE SERPL-SCNC: 100 MMOL/L (ref 98–107)
CHOLEST SERPL-MCNC: 144 MG/DL (ref 0–200)
CO2 SERPL-SCNC: 29.6 MMOL/L (ref 22–29)
CREAT SERPL-MCNC: 0.85 MG/DL (ref 0.76–1.27)
GLOBULIN SER CALC-MCNC: 2.4 GM/DL
GLUCOSE SERPL-MCNC: 131 MG/DL (ref 65–99)
HBA1C MFR BLD: 7.2 % (ref 4.8–5.6)
HDLC SERPL-MCNC: 39 MG/DL (ref 40–60)
LDLC SERPL CALC-MCNC: 57 MG/DL (ref 0–100)
MICROALBUMIN UR-MCNC: 7.9 UG/ML
POTASSIUM SERPL-SCNC: 4.5 MMOL/L (ref 3.5–5.2)
PROT SERPL-MCNC: 7.5 G/DL (ref 6–8.5)
SODIUM SERPL-SCNC: 138 MMOL/L (ref 136–145)
TRIGL SERPL-MCNC: 239 MG/DL (ref 0–150)
VLDLC SERPL CALC-MCNC: 47.8 MG/DL

## 2020-09-30 ENCOUNTER — RESULTS ENCOUNTER (OUTPATIENT)
Dept: FAMILY MEDICINE CLINIC | Facility: CLINIC | Age: 60
End: 2020-09-30

## 2020-09-30 DIAGNOSIS — Z12.5 SCREENING FOR PROSTATE CANCER: ICD-10-CM

## 2020-10-24 DIAGNOSIS — I10 ESSENTIAL HYPERTENSION: ICD-10-CM

## 2020-10-24 DIAGNOSIS — E11.9 TYPE 2 DIABETES MELLITUS WITHOUT COMPLICATION, WITHOUT LONG-TERM CURRENT USE OF INSULIN (HCC): ICD-10-CM

## 2020-10-24 DIAGNOSIS — E03.9 HYPOTHYROIDISM, UNSPECIFIED TYPE: ICD-10-CM

## 2020-10-26 RX ORDER — BLOOD SUGAR DIAGNOSTIC
STRIP MISCELLANEOUS
Qty: 300 EACH | Refills: 11 | Status: SHIPPED | OUTPATIENT
Start: 2020-10-26 | End: 2021-12-20 | Stop reason: SDUPTHER

## 2020-10-26 RX ORDER — SIMVASTATIN 10 MG
TABLET ORAL
Qty: 90 TABLET | Refills: 3 | Status: SHIPPED | OUTPATIENT
Start: 2020-10-26 | End: 2021-09-02

## 2020-10-26 RX ORDER — GLIMEPIRIDE 4 MG/1
TABLET ORAL
Qty: 90 TABLET | Refills: 3 | Status: SHIPPED | OUTPATIENT
Start: 2020-10-26 | End: 2021-09-02

## 2020-10-26 RX ORDER — LANCETS
EACH MISCELLANEOUS
Qty: 300 EACH | Refills: 11 | Status: SHIPPED | OUTPATIENT
Start: 2020-10-26 | End: 2021-12-20 | Stop reason: SDUPTHER

## 2020-10-26 RX ORDER — ISOPROPYL ALCOHOL 0.75 G/1
SWAB TOPICAL
Qty: 300 EACH | Refills: 11 | Status: SHIPPED | OUTPATIENT
Start: 2020-10-26 | End: 2021-12-20 | Stop reason: SDUPTHER

## 2020-10-26 RX ORDER — LEVOTHYROXINE SODIUM 0.15 MG/1
TABLET ORAL
Qty: 90 TABLET | Refills: 3 | Status: SHIPPED | OUTPATIENT
Start: 2020-10-26 | End: 2021-09-02

## 2021-01-04 DIAGNOSIS — E11.9 TYPE 2 DIABETES MELLITUS WITHOUT COMPLICATION, WITHOUT LONG-TERM CURRENT USE OF INSULIN (HCC): Primary | ICD-10-CM

## 2021-03-05 DIAGNOSIS — E11.9 TYPE 2 DIABETES MELLITUS WITHOUT COMPLICATION, WITHOUT LONG-TERM CURRENT USE OF INSULIN (HCC): Primary | ICD-10-CM

## 2021-03-08 NOTE — TELEPHONE ENCOUNTER
Caller: Chandler Mar    Relationship: Self    Best call back number: [phone]     Medication needed:   Requested Prescriptions     Pending Prescriptions Disp Refills   • metFORMIN (GLUCOPHAGE) 1000 MG tablet [Pharmacy Med Name: METFORMIN HYDROCHLORIDE 1000 MG Tablet] 180 tablet      Sig: TAKE 1 TABLET BY MOUTH 2 (TWO) TIMES A DAY WITH MEALS.     PATIENT SAID THAT HE NEEDS ALL OF HIS MEDICATIONS REFILLED. HE DOES NOT KNOW THE NAMES OF HIS MEDICATION BUT HE KNOWS HE NEEDS EVERYTHING REFILLED.    When do you need the refill by: 03/15    What details did the patient provide when requesting the medication: PATIENT SAID THAT HE NEEDS ALL OF HIS MEDICATIONS REFILLED. HE DOES NOT KNOW THE NAMES OF HIS MEDICATION BUT HE KNOWS HE NEEDS EVERYTHING REFILLED.    Does the patient have less than a 3 day supply:  [] Yes  [x] No    What is the patient's preferred pharmacy: [facility] - [facility] [address] - [phone] [facility] [phone] FX

## 2021-04-21 ENCOUNTER — TELEPHONE (OUTPATIENT)
Dept: FAMILY MEDICINE CLINIC | Facility: CLINIC | Age: 61
End: 2021-04-21

## 2021-04-21 DIAGNOSIS — I10 ESSENTIAL HYPERTENSION: Primary | ICD-10-CM

## 2021-04-22 RX ORDER — LISINOPRIL 20 MG/1
20 TABLET ORAL DAILY
Qty: 10 TABLET | Refills: 0 | Status: CANCELLED | OUTPATIENT
Start: 2021-04-22

## 2021-04-22 RX ORDER — LISINOPRIL 20 MG/1
20 TABLET ORAL DAILY
Qty: 14 TABLET | Refills: 0 | Status: SHIPPED | OUTPATIENT
Start: 2021-04-22 | End: 2021-05-06 | Stop reason: SDUPTHER

## 2021-04-22 RX ORDER — LISINOPRIL 20 MG/1
20 TABLET ORAL DAILY
Qty: 90 TABLET | Refills: 3 | Status: SHIPPED | OUTPATIENT
Start: 2021-04-22 | End: 2021-11-08

## 2021-04-22 RX ORDER — LISINOPRIL 20 MG/1
20 TABLET ORAL DAILY
Qty: 30 TABLET | Refills: 11 | Status: SHIPPED | OUTPATIENT
Start: 2021-04-22 | End: 2021-08-06 | Stop reason: SDUPTHER

## 2021-05-06 ENCOUNTER — OFFICE VISIT (OUTPATIENT)
Dept: FAMILY MEDICINE CLINIC | Facility: CLINIC | Age: 61
End: 2021-05-06

## 2021-05-06 VITALS
OXYGEN SATURATION: 98 % | TEMPERATURE: 96.9 F | BODY MASS INDEX: 48 KG/M2 | HEART RATE: 82 BPM | DIASTOLIC BLOOD PRESSURE: 86 MMHG | SYSTOLIC BLOOD PRESSURE: 138 MMHG | HEIGHT: 67 IN | RESPIRATION RATE: 16 BRPM | WEIGHT: 305.8 LBS

## 2021-05-06 DIAGNOSIS — E66.01 MORBID OBESITY (HCC): ICD-10-CM

## 2021-05-06 DIAGNOSIS — E11.9 TYPE 2 DIABETES MELLITUS WITHOUT COMPLICATION, WITHOUT LONG-TERM CURRENT USE OF INSULIN (HCC): Primary | ICD-10-CM

## 2021-05-06 DIAGNOSIS — I10 ESSENTIAL HYPERTENSION: ICD-10-CM

## 2021-05-06 LAB — HBA1C MFR BLD: 7.1 %

## 2021-05-06 PROCEDURE — 3044F HG A1C LEVEL LT 7.0%: CPT | Performed by: PHYSICIAN ASSISTANT

## 2021-05-06 PROCEDURE — 99213 OFFICE O/P EST LOW 20 MIN: CPT | Performed by: PHYSICIAN ASSISTANT

## 2021-05-06 PROCEDURE — 83036 HEMOGLOBIN GLYCOSYLATED A1C: CPT | Performed by: PHYSICIAN ASSISTANT

## 2021-05-06 NOTE — PROGRESS NOTES
Subjective   Chandler Mar is a 61 y.o. male  Diabetes (A1C 7.1% ) and Hypertension (F/U)      History of Present Illness  patient is a pleasant 61-year-old white male who comes in for follow-up type 2 diabetes patient's A1c today is 7.1 that is improved patient also comes in for follow-up of hypertension blood pressure today is 138/86 no problems or complaints doing better feeling better has lost some weight patient's BMI is 47.88 no shortness breath no chest pain  The following portions of the patient's history were reviewed and updated as appropriate: allergies, current medications, past social history and problem list    Review of Systems   Constitutional: Negative for appetite change, diaphoresis, fatigue and unexpected weight change.   Eyes: Negative for visual disturbance.   Respiratory: Negative for cough, chest tightness and shortness of breath.    Cardiovascular: Negative for chest pain, palpitations and leg swelling.   Gastrointestinal: Negative for diarrhea, nausea and vomiting.   Endocrine: Negative for polydipsia, polyphagia and polyuria.   Skin: Negative for color change and rash.   Neurological: Negative for dizziness, syncope, weakness, light-headedness, numbness and headaches.       Objective     Vitals:    05/06/21 1400   BP: 138/86   Pulse: 82   Resp: 16   Temp: 96.9 °F (36.1 °C)   SpO2: 98%       Physical Exam  Vitals and nursing note reviewed.   Constitutional:       Appearance: He is well-developed. He is not diaphoretic.   Neck:      Thyroid: No thyromegaly.      Vascular: No JVD.   Cardiovascular:      Rate and Rhythm: Normal rate and regular rhythm.      Pulses: Normal pulses.      Heart sounds: Normal heart sounds. No murmur heard.     Pulmonary:      Effort: Pulmonary effort is normal. No respiratory distress.      Breath sounds: Normal breath sounds.   Abdominal:      General: There is no distension.      Palpations: Abdomen is soft.   Musculoskeletal:      Cervical back: Neck supple.    Lymphadenopathy:      Cervical: No cervical adenopathy.   Skin:     General: Skin is warm and dry.   Neurological:      Sensory: No sensory deficit.         Assessment/Plan     Diagnoses and all orders for this visit:    1. Type 2 diabetes mellitus without complication, without long-term current use of insulin (CMS/HCC) (Primary)  -     POC Glycosylated Hemoglobin (Hb A1C)    2. Morbid obesity (CMS/HCC)    Continue diet and exercise follow-up in 1 month,     recheck at that time.

## 2021-08-02 ENCOUNTER — TELEPHONE (OUTPATIENT)
Dept: SLEEP MEDICINE | Facility: HOSPITAL | Age: 61
End: 2021-08-02

## 2021-08-02 NOTE — TELEPHONE ENCOUNTER
Patient called in wondering if he was allowed to put his heated CPAP hose in water to clean it. Pt states he was using [employer] before the recall on it, and was unsure on how to clean his products. I informed him that he will need to contact his [employer] company with questions on CPAP maintenance. Pt verbalized understanding.

## 2021-08-02 NOTE — TELEPHONE ENCOUNTER
PATIENT CALLED REGARDING RECALLED MACHINE. ADVISED PATIENT THAT IT WOULD BE HIS DECISION WHETHER HE USES IT OR NOT. HE REGISTERED HIS MACHINE AND WILL WAIT FOR [facility]ARY [facility] DECISION TO REPAIR OR REPLACE.. PATIENT STATES THAT HE HAS A [employer] MACHINE AND I ADVISED HIM TO TERMINATE USAGE OF THAT.

## 2021-08-06 ENCOUNTER — OFFICE VISIT (OUTPATIENT)
Dept: FAMILY MEDICINE CLINIC | Facility: CLINIC | Age: 61
End: 2021-08-06

## 2021-08-06 VITALS
TEMPERATURE: 96.9 F | BODY MASS INDEX: 48 KG/M2 | RESPIRATION RATE: 16 BRPM | HEIGHT: 67 IN | OXYGEN SATURATION: 99 % | DIASTOLIC BLOOD PRESSURE: 74 MMHG | HEART RATE: 84 BPM | WEIGHT: 305.8 LBS | SYSTOLIC BLOOD PRESSURE: 134 MMHG

## 2021-08-06 DIAGNOSIS — I10 ESSENTIAL HYPERTENSION: ICD-10-CM

## 2021-08-06 DIAGNOSIS — E11.9 TYPE 2 DIABETES MELLITUS WITHOUT COMPLICATION, WITHOUT LONG-TERM CURRENT USE OF INSULIN (HCC): Primary | ICD-10-CM

## 2021-08-06 PROCEDURE — 99213 OFFICE O/P EST LOW 20 MIN: CPT | Performed by: PHYSICIAN ASSISTANT

## 2021-08-06 NOTE — PROGRESS NOTES
Subjective   Chandler Mar is a 61 y.o. male  Diabetes (, A1C 7.2%) and Hypertension      History of Present Illness  Patient is a pleasant 61-year-old white male who comes in for follow-up of diabetes and hypertension blood sugar today is 166 A1c 7.2 unchanged from previous visit blood pressure is controlled, has lost 3 pounds doing well no problems or complaints  The following portions of patient's history were reviewed and updated as appropriate: allergies, current medications, past social history and problem list    Review of Systems   Constitutional: Negative for appetite change, diaphoresis, fatigue and unexpected weight change.   Eyes: Negative for visual disturbance.   Respiratory: Negative for cough, chest tightness and shortness of breath.    Cardiovascular: Negative for chest pain, palpitations and leg swelling.   Gastrointestinal: Negative for diarrhea, nausea and vomiting.   Endocrine: Negative for polydipsia, polyphagia and polyuria.   Skin: Negative for color change and rash.   Neurological: Negative for dizziness, syncope, weakness, light-headedness, numbness and headaches.       Objective     Vitals:    08/06/21 1145   BP: 134/74   Pulse: 84   Resp: 16   Temp: 96.9 °F (36.1 °C)   SpO2: 99%       Physical Exam  Vitals and nursing note reviewed.   Constitutional:       Appearance: He is well-developed. He is not diaphoretic.   Neck:      Thyroid: No thyromegaly.      Vascular: No JVD.   Cardiovascular:      Rate and Rhythm: Normal rate and regular rhythm.      Pulses: Normal pulses.      Heart sounds: Normal heart sounds. No murmur heard.     Pulmonary:      Effort: Pulmonary effort is normal. No respiratory distress.      Breath sounds: Normal breath sounds.   Abdominal:      General: Bowel sounds are normal.      Palpations: Abdomen is soft.      Tenderness: There is no abdominal tenderness.   Musculoskeletal:      Cervical back: Neck supple.   Lymphadenopathy:      Cervical: No cervical  adenopathy.   Skin:     General: Skin is warm and dry.   Neurological:      Sensory: No sensory deficit.         Assessment/Plan     Diagnoses and all orders for this visit:    1. Type 2 diabetes mellitus without complication, without long-term current use of insulin (CMS/[facility]) (Primary)    2. Essential hypertension    #1 continue with diet and exercise and all meds continue to lose weight down    I spent 15 minutes in patient care: Reviewing records prior to the visit, examining the patient, entering orders and documentation    Please note that portions of this document were completed with a voice recognition program. Efforts were made to edit the dictations, but occasionally words are mis-transcribed

## 2021-08-17 ENCOUNTER — OFFICE VISIT (OUTPATIENT)
Dept: SLEEP MEDICINE | Facility: HOSPITAL | Age: 61
End: 2021-08-17

## 2021-08-17 VITALS
OXYGEN SATURATION: 96 % | DIASTOLIC BLOOD PRESSURE: 71 MMHG | WEIGHT: 308.6 LBS | SYSTOLIC BLOOD PRESSURE: 118 MMHG | HEART RATE: 84 BPM | HEIGHT: 67 IN | BODY MASS INDEX: 48.44 KG/M2

## 2021-08-17 DIAGNOSIS — G47.33 OSA (OBSTRUCTIVE SLEEP APNEA): Primary | ICD-10-CM

## 2021-08-17 PROCEDURE — 99213 OFFICE O/P EST LOW 20 MIN: CPT | Performed by: NURSE PRACTITIONER

## 2021-08-17 NOTE — PROGRESS NOTES
Chief Complaint:   Chief Complaint   Patient presents with   • Follow-up       HPI:    Chandler Mar is a 61 y.o. male here for follow-up of apnea.  Patient has a history of hypertension, type 2 diabetes, morbid obesity, hypothyroidism, allergic rhinitis and severe obstructive sleep apnea.  Patient was last seen 8/17/2020.  Patient continues to use CPAP without difficulty.  Sleeping 6 to 9 hours nightly and feels refreshed upon awakening.  He goes to sleep within 5 minutes and does get up x1 in the night.  Patient has an McGrath score of 2/24.  Patient has no concerns or complaints regarding CPAP use and wishes to continue.        Current medications are:   Current Outpatient Medications:   •  Accu-Chek Softclix Lancets lancets, USE TO TEST BLOOD SUGAR THREE TIMES DAILY, Disp: 300 each, Rfl: 11  •  Alcohol Swabs (B-D SINGLE USE SWABS REGULAR) pads, USE TO CLEAN FINGER PRIOR TO CHECKING BLOOD SUGAR THREE TIMES DAILY, Disp: 300 each, Rfl: 11  •  betaxolol (BETOPTIC-S) 0.25 % ophthalmic suspension, 1 drop 2 (Two) Times a Day., Disp: , Rfl:   •  Blood Glucose Calibration (GLUCOSE CONTROL) solution, Use to calibrate testing supplies. Dx E11.65, Disp: 1 each, Rfl: 11  •  fluticasone (FLONASE) 50 MCG/ACT nasal spray, 2 sprays into the nostril(s) as directed by provider Daily., Disp: 9.9 mL, Rfl: 1  •  glimepiride (AMARYL) 4 MG tablet, TAKE 1 TABLET BY MOUTH IN THE MORNING BEFORE BREAKFAST, Disp: 90 tablet, Rfl: 3  •  glucose blood (Accu-Chek Stacy Plus) test strip, TEST BLOOD SUGAR THREE TIMES DAILY, Disp: 300 each, Rfl: 11  •  glucose monitor monitoring kit, Use to check blood sugar three times daily. Dx E11.9, Disp: 1 each, Rfl: 0  •  latanoprost (XALATAN) 0.005 % ophthalmic solution, , Disp: , Rfl:   •  levothyroxine (SYNTHROID, LEVOTHROID) 150 MCG tablet, TAKE 1 TABLET EVERY DAY, Disp: 90 tablet, Rfl: 3  •  lisinopril (PRINIVIL,ZESTRIL) 20 MG tablet, Take 1 tablet by mouth Daily., Disp: 90 tablet, Rfl: 3  •   metFORMIN (GLUCOPHAGE) 1000 MG tablet, TAKE 1 TABLET BY MOUTH TWICE DAILY WITH FOOD, Disp: 180 tablet, Rfl: 1  •  metFORMIN (GLUCOPHAGE) 1000 MG tablet, TAKE 1 TABLET BY MOUTH 2 (TWO) TIMES A DAY WITH MEALS., Disp: 180 tablet, Rfl: 3  •  metoprolol tartrate (LOPRESSOR) 25 MG tablet, TAKE ONE TABLET BY MOUTH TWICE DAILY. MAY BREAK IN HALF IF NEEDED., Disp: 180 tablet, Rfl: 3  •  simvastatin (ZOCOR) 10 MG tablet, TAKE 1 TABLET EVERY DAY, Disp: 90 tablet, Rfl: 3  •  trimethoprim-polymyxin b (POLYTRIM) 07898-0.1 UNIT/ML-% ophthalmic solution, , Disp: , Rfl: .      The patient's relevant past medical, surgical, family and social history were reviewed and updated in Epic as appropriate.       Review of Systems   HENT: Positive for hearing loss.    Eyes: Positive for visual disturbance.   Respiratory: Positive for apnea.    Gastrointestinal: Positive for constipation and diarrhea.   Musculoskeletal: Positive for gait problem and joint swelling.   Allergic/Immunologic: Positive for environmental allergies.   Neurological: Positive for numbness.   Psychiatric/Behavioral: Positive for sleep disturbance.   All other systems reviewed and are negative.        Objective:    Physical Exam  Constitutional:       Appearance: Normal appearance. He is obese.   HENT:      Head: Normocephalic and atraumatic.   Eyes:      Conjunctiva/sclera: Conjunctivae normal.   Cardiovascular:      Rate and Rhythm: Normal rate.   Pulmonary:      Effort: Pulmonary effort is normal.      Breath sounds: Normal breath sounds.   Skin:     General: Skin is warm and dry.   Neurological:      Mental Status: He is alert and oriented to person, place, and time.   Psychiatric:         Mood and Affect: Mood normal.         Behavior: Behavior normal.         Thought Content: Thought content normal.         Judgment: Judgment normal.     90/90 days of use  Greater than 4-hour use 100%  9% pressure 15.5  AHI 1.1  Settings 14-20      ASSESSMENT/PLAN    Diagnoses and  all orders for this visit:    1. LUISANA (obstructive sleep apnea) (Primary)  -     CPAP Therapy            1. Counseled patient regarding multimodal approach with healthy nutrition, healthy sleep, regular physical activity, social activities, counseling, and medications. Encouraged to practice lateral sleep position. Avoid alcohol and sedatives close to bedtime.  2.   Refill supplies x1 year.  Return to clinic 1 year or sooner symptoms warrant.  I have reviewed the results of my evaluation and impression and discussed my recommendations in detail with the patient.      Signed by  ANTHONY Morris    August 17, 2021      CC: Codey Josue PA          No ref. provider found

## 2021-09-01 DIAGNOSIS — E11.9 TYPE 2 DIABETES MELLITUS WITHOUT COMPLICATION, WITHOUT LONG-TERM CURRENT USE OF INSULIN (HCC): ICD-10-CM

## 2021-09-01 DIAGNOSIS — E03.9 HYPOTHYROIDISM, UNSPECIFIED TYPE: ICD-10-CM

## 2021-09-01 DIAGNOSIS — I10 ESSENTIAL HYPERTENSION: ICD-10-CM

## 2021-09-02 RX ORDER — GLIMEPIRIDE 4 MG/1
TABLET ORAL
Qty: 90 TABLET | Refills: 3 | Status: SHIPPED | OUTPATIENT
Start: 2021-09-02 | End: 2022-06-14

## 2021-09-02 RX ORDER — SIMVASTATIN 10 MG
TABLET ORAL
Qty: 90 TABLET | Refills: 3 | Status: SHIPPED | OUTPATIENT
Start: 2021-09-02 | End: 2022-06-14

## 2021-09-02 RX ORDER — LEVOTHYROXINE SODIUM 0.15 MG/1
TABLET ORAL
Qty: 90 TABLET | Refills: 3 | Status: SHIPPED | OUTPATIENT
Start: 2021-09-02 | End: 2021-11-08 | Stop reason: SDUPTHER

## 2021-11-08 ENCOUNTER — OFFICE VISIT (OUTPATIENT)
Dept: FAMILY MEDICINE CLINIC | Facility: CLINIC | Age: 61
End: 2021-11-08

## 2021-11-08 ENCOUNTER — LAB (OUTPATIENT)
Dept: LAB | Facility: HOSPITAL | Age: 61
End: 2021-11-08

## 2021-11-08 VITALS
BODY MASS INDEX: 49.19 KG/M2 | OXYGEN SATURATION: 99 % | RESPIRATION RATE: 14 BRPM | DIASTOLIC BLOOD PRESSURE: 90 MMHG | TEMPERATURE: 96.9 F | HEART RATE: 68 BPM | SYSTOLIC BLOOD PRESSURE: 148 MMHG | WEIGHT: 313.4 LBS | HEIGHT: 67 IN

## 2021-11-08 DIAGNOSIS — I10 PRIMARY HYPERTENSION: ICD-10-CM

## 2021-11-08 DIAGNOSIS — E11.9 TYPE 2 DIABETES MELLITUS WITHOUT COMPLICATION, WITHOUT LONG-TERM CURRENT USE OF INSULIN (HCC): ICD-10-CM

## 2021-11-08 DIAGNOSIS — E03.9 HYPOTHYROIDISM, UNSPECIFIED TYPE: ICD-10-CM

## 2021-11-08 DIAGNOSIS — E11.9 TYPE 2 DIABETES MELLITUS WITHOUT COMPLICATION, WITHOUT LONG-TERM CURRENT USE OF INSULIN (HCC): Primary | ICD-10-CM

## 2021-11-08 LAB
EXPIRATION DATE: NORMAL
GLUCOSE BLDC GLUCOMTR-MCNC: 225 MG/DL (ref 70–130)
HBA1C MFR BLD: 7.5 %
Lab: NORMAL

## 2021-11-08 PROCEDURE — 99213 OFFICE O/P EST LOW 20 MIN: CPT | Performed by: PHYSICIAN ASSISTANT

## 2021-11-08 PROCEDURE — 83036 HEMOGLOBIN GLYCOSYLATED A1C: CPT | Performed by: PHYSICIAN ASSISTANT

## 2021-11-08 PROCEDURE — 3051F HG A1C>EQUAL 7.0%<8.0%: CPT | Performed by: PHYSICIAN ASSISTANT

## 2021-11-08 PROCEDURE — 82962 GLUCOSE BLOOD TEST: CPT | Performed by: PHYSICIAN ASSISTANT

## 2021-11-08 RX ORDER — LEVOTHYROXINE SODIUM 0.15 MG/1
150 TABLET ORAL DAILY
Qty: 90 TABLET | Refills: 3 | Status: SHIPPED | OUTPATIENT
Start: 2021-11-08 | End: 2022-08-30

## 2021-11-08 RX ORDER — LISINOPRIL 10 MG/1
10 TABLET ORAL DAILY
Qty: 30 TABLET | Refills: 3 | Status: SHIPPED | OUTPATIENT
Start: 2021-11-08 | End: 2022-01-11

## 2021-11-08 NOTE — PROGRESS NOTES
Subjective   Chandler Mar is a 61 y.o. male  Diabetes (, A1C 7.5%) and Hypertension (Stopped lisinopril 2 months ago, thinks it caused hypotension )      History of Present Illness  Patient is a pleasant 61-year-old white male who comes in for follow-up of diabetes patient states blood sugar 225 A1c 7.5 patient has gained little weight try to watch his diet and exercise patient stopped his lisinopril 2 months ago thinking it caused hypotension states has been having headache also back on medication.  Patient also has history of hypothyroidism needs follow-up lab work chronic illness  The following portions of the patient's history were reviewed and updated as appropriate: allergies, current medications, past social history and problem list    Review of Systems   Constitutional: Negative for appetite change, diaphoresis, fatigue and unexpected weight change.   Eyes: Negative for visual disturbance.   Respiratory: Negative for cough, chest tightness and shortness of breath.    Cardiovascular: Negative for chest pain, palpitations and leg swelling.   Gastrointestinal: Negative for diarrhea, nausea and vomiting.   Endocrine: Negative for polydipsia, polyphagia and polyuria.   Skin: Negative for color change and rash.   Neurological: Negative for dizziness, syncope, weakness, light-headedness, numbness and headaches.       Objective     Vitals:    11/08/21 1048   BP: 148/90   Pulse: 68   Resp: 14   Temp: 96.9 °F (36.1 °C)   SpO2: 99%       Physical Exam  Vitals and nursing note reviewed.   Constitutional:       Appearance: He is well-developed. He is not diaphoretic.   Neck:      Thyroid: No thyromegaly.      Vascular: No JVD.   Cardiovascular:      Rate and Rhythm: Normal rate and regular rhythm.      Pulses: Normal pulses.      Heart sounds: Normal heart sounds. No murmur heard.      Pulmonary:      Effort: Pulmonary effort is normal. No respiratory distress.      Breath sounds: Normal breath sounds.    Abdominal:      General: Bowel sounds are normal.      Palpations: Abdomen is soft.      Tenderness: There is no abdominal tenderness.   Musculoskeletal:      Cervical back: Neck supple.   Lymphadenopathy:      Cervical: No cervical adenopathy.   Skin:     General: Skin is warm and dry.   Neurological:      Sensory: No sensory deficit.         Assessment/Plan     Diagnoses and all orders for this visit:    1. Type 2 diabetes mellitus without complication, without long-term current use of insulin (HCC) (Primary)  -     POC Glycosylated Hemoglobin (Hb A1C)  -     POCT Glucose  -     Comprehensive Metabolic Panel; Future  -     Lipid Panel; Future  -     MicroAlbumin, Urine, Random - Urine, Clean Catch; Future    2. Hypothyroidism, unspecified type  -     levothyroxine (SYNTHROID, LEVOTHROID) 150 MCG tablet; Take 1 tablet by mouth Daily.  Dispense: 90 tablet; Refill: 3  -     TSH; Future    3. Primary hypertension  -     lisinopril (PRINIVIL,ZESTRIL) 10 MG tablet; Take 1 tablet by mouth Daily.  Dispense: 30 tablet; Refill: 3     I spent 15 minutes in patient care: Reviewing records prior to the visit, examining the patient, entering orders and documentation    Part of this note may be an electronic transcription/translation of spoken language to printed text using the Dragon Dictation System.

## 2021-11-09 LAB
ALBUMIN SERPL-MCNC: 5 G/DL (ref 3.5–5.2)
ALBUMIN/GLOB SERPL: 2 G/DL
ALP SERPL-CCNC: 55 U/L (ref 39–117)
ALT SERPL-CCNC: 37 U/L (ref 1–41)
AST SERPL-CCNC: 23 U/L (ref 1–40)
BILIRUB SERPL-MCNC: 0.4 MG/DL (ref 0–1.2)
BUN SERPL-MCNC: 8 MG/DL (ref 8–23)
BUN/CREAT SERPL: 11 (ref 7–25)
CALCIUM SERPL-MCNC: 9.6 MG/DL (ref 8.6–10.5)
CHLORIDE SERPL-SCNC: 98 MMOL/L (ref 98–107)
CHOLEST SERPL-MCNC: 134 MG/DL (ref 0–200)
CO2 SERPL-SCNC: 25.9 MMOL/L (ref 22–29)
CREAT SERPL-MCNC: 0.73 MG/DL (ref 0.76–1.27)
GLOBULIN SER CALC-MCNC: 2.5 GM/DL
GLUCOSE SERPL-MCNC: 173 MG/DL (ref 65–99)
HDLC SERPL-MCNC: 44 MG/DL (ref 40–60)
LDLC SERPL CALC-MCNC: 64 MG/DL (ref 0–100)
MICROALBUMIN UR-MCNC: 7.7 UG/ML
POTASSIUM SERPL-SCNC: 4.7 MMOL/L (ref 3.5–5.2)
PROT SERPL-MCNC: 7.5 G/DL (ref 6–8.5)
SODIUM SERPL-SCNC: 137 MMOL/L (ref 136–145)
TRIGL SERPL-MCNC: 153 MG/DL (ref 0–150)
TSH SERPL DL<=0.005 MIU/L-ACNC: 0.54 UIU/ML (ref 0.27–4.2)
VLDLC SERPL CALC-MCNC: 26 MG/DL (ref 5–40)

## 2021-12-10 DIAGNOSIS — E11.9 TYPE 2 DIABETES MELLITUS WITHOUT COMPLICATION, WITHOUT LONG-TERM CURRENT USE OF INSULIN (HCC): ICD-10-CM

## 2021-12-20 DIAGNOSIS — J01.10 ACUTE FRONTAL SINUSITIS, RECURRENCE NOT SPECIFIED: ICD-10-CM

## 2021-12-20 DIAGNOSIS — E03.9 HYPOTHYROIDISM, UNSPECIFIED TYPE: ICD-10-CM

## 2021-12-20 DIAGNOSIS — I10 PRIMARY HYPERTENSION: ICD-10-CM

## 2021-12-20 DIAGNOSIS — E11.9 TYPE 2 DIABETES MELLITUS WITHOUT COMPLICATION, WITHOUT LONG-TERM CURRENT USE OF INSULIN (HCC): ICD-10-CM

## 2021-12-20 RX ORDER — ISOPROPYL ALCOHOL 0.75 G/1
SWAB TOPICAL
Qty: 300 EACH | Refills: 11 | Status: SHIPPED | OUTPATIENT
Start: 2021-12-20

## 2021-12-20 RX ORDER — LANCETS
EACH MISCELLANEOUS
Qty: 300 EACH | Refills: 11 | Status: SHIPPED | OUTPATIENT
Start: 2021-12-20 | End: 2023-01-16

## 2021-12-20 RX ORDER — BLOOD SUGAR DIAGNOSTIC
STRIP MISCELLANEOUS
Qty: 300 EACH | Refills: 11 | Status: SHIPPED | OUTPATIENT
Start: 2021-12-20

## 2021-12-20 RX ORDER — BLOOD-GLUCOSE CONTROL, NORMAL
EACH MISCELLANEOUS
Qty: 1 EACH | Refills: 11 | Status: SHIPPED | OUTPATIENT
Start: 2021-12-20

## 2021-12-20 RX ORDER — ISOPROPYL ALCOHOL 0.75 G/1
SWAB TOPICAL
Qty: 300 EACH | Refills: 11 | Status: SHIPPED | OUTPATIENT
Start: 2021-12-20 | End: 2023-02-08

## 2021-12-20 RX ORDER — BLOOD SUGAR DIAGNOSTIC
STRIP MISCELLANEOUS
Qty: 300 EACH | Refills: 11 | Status: SHIPPED | OUTPATIENT
Start: 2021-12-20 | End: 2023-01-06

## 2021-12-20 RX ORDER — LANCETS
EACH MISCELLANEOUS
Qty: 300 EACH | Refills: 11 | Status: SHIPPED | OUTPATIENT
Start: 2021-12-20

## 2021-12-20 NOTE — TELEPHONE ENCOUNTER
Caller: Chandler Mar    Relationship: Self    Best call back number: [phone]     Requested Prescriptions:   Requested Prescriptions     Pending Prescriptions Disp Refills   • glucose blood (Accu-Chek Stacy Plus) test strip 300 each 11     Sig: TEST BLOOD SUGAR THREE TIMES DAILY   • Accu-Chek Softclix Lancets lancets 300 each 11     Sig: USE TO TEST BLOOD SUGAR THREE TIMES DAILY   • Blood Glucose Calibration (Glucose Control) solution 1 each 11     Sig: Use to calibrate testing supplies. Dx E11.65   • Alcohol Swabs (B-D SINGLE USE SWABS REGULAR) pads 300 each 11     Sig: USE TO CLEAN FINGER PRIOR TO CHECKING BLOOD SUGAR THREE TIMES DAILY        Pharmacy where request should be sent: [facility] - [facility] [address] - [phone] [facility] [phone] FX     Additional details provided by patient: PATIENT NOT OUT BUT PHARMACY STATED THEY ARE UNABLE TO GET HOLD OF CLINIC    Does the patient have less than a 3 day supply:  [] Yes  [x] No    Reynaldo Carpio Rep   12/20/21 14:35 EST      Caller requesting test results: RESULTS    What test was performed: BLOOD WORK    When was the test performed: 3-4 WEEKS AGO    Where was the test performed: [facility]    Additional notes: WANTS TO KNOW WHAT THE THYROID RESULTS ARE

## 2022-01-10 DIAGNOSIS — I10 PRIMARY HYPERTENSION: ICD-10-CM

## 2022-01-11 RX ORDER — LISINOPRIL 10 MG/1
TABLET ORAL
Qty: 90 TABLET | Refills: 1 | Status: SHIPPED | OUTPATIENT
Start: 2022-01-11 | End: 2022-06-30

## 2022-02-08 ENCOUNTER — OFFICE VISIT (OUTPATIENT)
Dept: FAMILY MEDICINE CLINIC | Facility: CLINIC | Age: 62
End: 2022-02-08

## 2022-02-08 VITALS
DIASTOLIC BLOOD PRESSURE: 78 MMHG | RESPIRATION RATE: 14 BRPM | WEIGHT: 312 LBS | HEIGHT: 67 IN | BODY MASS INDEX: 48.97 KG/M2 | SYSTOLIC BLOOD PRESSURE: 126 MMHG | TEMPERATURE: 97 F

## 2022-02-08 DIAGNOSIS — Z00.00 ENCOUNTER FOR SUBSEQUENT ANNUAL WELLNESS VISIT IN MEDICARE PATIENT: Primary | ICD-10-CM

## 2022-02-08 DIAGNOSIS — Z00.00 ROUTINE GENERAL MEDICAL EXAMINATION AT A HEALTH CARE FACILITY: ICD-10-CM

## 2022-02-08 DIAGNOSIS — E66.01 MORBID OBESITY: ICD-10-CM

## 2022-02-08 DIAGNOSIS — Z12.5 SCREENING FOR PROSTATE CANCER: ICD-10-CM

## 2022-02-08 DIAGNOSIS — E08.311 DIABETES MELLITUS DUE TO UNDERLYING CONDITION WITH RETINOPATHY AND MACULAR EDEMA, WITHOUT LONG-TERM CURRENT USE OF INSULIN, UNSPECIFIED LATERALITY, UNSPECIFIED RETINOPATHY SEVERITY: ICD-10-CM

## 2022-02-08 PROCEDURE — G0439 PPPS, SUBSEQ VISIT: HCPCS | Performed by: PHYSICIAN ASSISTANT

## 2022-02-08 PROCEDURE — 96160 PT-FOCUSED HLTH RISK ASSMT: CPT | Performed by: PHYSICIAN ASSISTANT

## 2022-02-08 PROCEDURE — 1170F FXNL STATUS ASSESSED: CPT | Performed by: PHYSICIAN ASSISTANT

## 2022-02-08 PROCEDURE — 93000 ELECTROCARDIOGRAM COMPLETE: CPT | Performed by: PHYSICIAN ASSISTANT

## 2022-02-08 PROCEDURE — 1159F MED LIST DOCD IN RCRD: CPT | Performed by: PHYSICIAN ASSISTANT

## 2022-02-08 PROCEDURE — 1126F AMNT PAIN NOTED NONE PRSNT: CPT | Performed by: PHYSICIAN ASSISTANT

## 2022-02-08 PROCEDURE — 99396 PREV VISIT EST AGE 40-64: CPT | Performed by: PHYSICIAN ASSISTANT

## 2022-02-08 RX ORDER — TIMOLOL MALEATE 5 MG/ML
SOLUTION/ DROPS OPHTHALMIC
COMMUNITY
Start: 2021-12-21

## 2022-02-08 NOTE — PROGRESS NOTES
The ABCs of the Annual Wellness Visit  Subsequent Medicare Wellness Visit    Chief Complaint   Patient presents with   • Medicare Wellness-subsequent     Not fasting. Due for colonoscopy-will call to schedule. Declines vaccines.       Subjective   History of Present Illness:  Chandler Mar is a 62 y.o. male who presents for a Subsequent Medicare Wellness Visit.    The following portions of the patient's history were reviewed and   updated as appropriate: allergies, current medications, past family history, past medical history, past social history, past surgical history and problem list.    Compared to one year ago, the patient feels his physical   health is better.    Compared to one year ago, the patient feels his mental   health is better.    Recent Hospitalizations:  He was not admitted to the hospital during the last year.       Current Medical Providers:  Patient Care Team:  Codey Josue PA as PCP - General (Family Medicine)  Favian Mojica MD as Surgeon (Neurosurgery)  Brandon Saleh MD as Consulting Physician (Anesthesiology)    Outpatient Medications Prior to Visit   Medication Sig Dispense Refill   • Accu-Chek Stacy Plus test strip TEST BLOOD SUGAR THREE TIMES DAILY 300 each 11   • Accu-Chek Softclix Lancets lancets USE TO TEST BLOOD SUGAR THREE TIMES DAILY 300 each 11   • Accu-Chek Softclix Lancets lancets USE TO TEST BLOOD SUGAR THREE TIMES DAILY 300 each 11   • Alcohol Swabs (B-D SINGLE USE SWABS REGULAR) pads USE TO CLEAN FINGER PRIOR TO CHECKING BLOOD SUGAR THREE TIMES DAILY 300 each 11   • Alcohol Swabs (B-D SINGLE USE SWABS REGULAR) pads USE TO CLEAN FINGER PRIOR TO CHECKING BLOOD SUGAR THREE TIMES DAILY 300 each 11   • Blood Glucose Calibration (Glucose Control) solution Use to calibrate testing supplies. Dx E11.65 1 each 11   • fluticasone (FLONASE) 50 MCG/ACT nasal spray 2 sprays into the nostril(s) as directed by provider Daily. 9.9 mL 1   • glimepiride (AMARYL) 4 MG tablet TAKE 1  TABLET IN THE MORNING BEFORE BREAKFAST 90 tablet 3   • glucose blood (Accu-Chek Stacy Plus) test strip TEST BLOOD SUGAR THREE TIMES DAILY 300 each 11   • glucose monitor monitoring kit Use to check blood sugar three times daily. Dx E11.9 1 each 0   • latanoprost (XALATAN) 0.005 % ophthalmic solution      • levothyroxine (SYNTHROID, LEVOTHROID) 150 MCG tablet Take 1 tablet by mouth Daily. 90 tablet 3   • lisinopril (PRINIVIL,ZESTRIL) 10 MG tablet TAKE 1 TABLET EVERY DAY 90 tablet 1   • metFORMIN (GLUCOPHAGE) 1000 MG tablet TAKE 1 TABLET TWICE DAILY WITH MEALS 180 tablet 3   • metoprolol tartrate (LOPRESSOR) 25 MG tablet TAKE ONE TABLET BY MOUTH TWICE DAILY. MAY BREAK IN HALF IF NEEDED. 180 tablet 3   • simvastatin (ZOCOR) 10 MG tablet TAKE 1 TABLET EVERY DAY 90 tablet 3   • trimethoprim-polymyxin b (POLYTRIM) 57691-5.1 UNIT/ML-% ophthalmic solution      • betaxolol (BETOPTIC-S) 0.25 % ophthalmic suspension 1 drop 2 (Two) Times a Day.     • timolol (TIMOPTIC) 0.5 % ophthalmic solution        No facility-administered medications prior to visit.       No opioid medication identified on active medication list. I have reviewed chart for other potential  high risk medication/s and harmful drug interactions in the elderly.          Aspirin is not on active medication list.  Aspirin use is not indicated based on review of current medical condition/s. Risk of harm outweighs potential benefits.  .    Patient Active Problem List   Diagnosis   • Back pain   • Essential hypertension   • Type 2 diabetes mellitus without complication, without long-term current use of insulin (HCC)   • Morbid obesity (HCC)   • Severe obstructive sleep apnea     Advance Care Planning  has an advanced directive - a copy HAS NOT been provided. Have asked the patient to send this to us to add to record    Review of Systems   Constitutional: Negative for appetite change, diaphoresis, fatigue and unexpected weight change.   Eyes: Negative for visual  "disturbance.   Respiratory: Negative for cough, chest tightness and shortness of breath.    Cardiovascular: Negative for chest pain, palpitations and leg swelling.   Gastrointestinal: Negative for diarrhea, nausea and vomiting.   Endocrine: Negative for polydipsia, polyphagia and polyuria.   Skin: Negative for color change and rash.   Neurological: Negative for dizziness, syncope, weakness, light-headedness, numbness and headaches.         Objective      Vitals:    02/08/22 0850   BP: 126/78   Resp: 14   Temp: 97 °F (36.1 °C)   TempSrc: Infrared   Weight: (!) 142 kg (312 lb)   Height: 170.2 cm (67.01\")   PainSc: 0-No pain     BMI Readings from Last 1 Encounters:   02/08/22 48.85 kg/m²   BMI is above normal parameters. Recommendations include: educational material and exercise counseling    Does the patient have evidence of cognitive impairment? No    Physical Exam  Vitals and nursing note reviewed.   Constitutional:       General: He is not in acute distress.     Appearance: Normal appearance. He is well-developed. He is not ill-appearing, toxic-appearing or diaphoretic.   HENT:      Head: Normocephalic and atraumatic.      Right Ear: External ear normal.      Left Ear: External ear normal.   Eyes:      Conjunctiva/sclera: Conjunctivae normal.      Pupils: Pupils are equal, round, and reactive to light.   Neck:      Thyroid: No thyromegaly.      Vascular: No carotid bruit.   Cardiovascular:      Rate and Rhythm: Normal rate and regular rhythm.      Pulses: Normal pulses.      Heart sounds: Normal heart sounds. No murmur heard.      Pulmonary:      Effort: Pulmonary effort is normal. No respiratory distress.      Breath sounds: Normal breath sounds.   Abdominal:      General: Bowel sounds are normal.      Palpations: Abdomen is soft. There is no mass.      Tenderness: There is no abdominal tenderness.   Musculoskeletal:         General: No swelling. Normal range of motion.      Cervical back: Normal range of motion " and neck supple.   Lymphadenopathy:      Cervical: No cervical adenopathy.   Skin:     General: Skin is warm and dry.      Findings: No lesion or rash.   Neurological:      Mental Status: He is alert and oriented to person, place, and time.      Cranial Nerves: No cranial nerve deficit.      Sensory: No sensory deficit.      Motor: No weakness.      Coordination: Coordination normal.      Gait: Gait normal.      Deep Tendon Reflexes: Reflexes are normal and symmetric.   Psychiatric:         Mood and Affect: Mood normal.         Behavior: Behavior normal.         Thought Content: Thought content normal.         Judgment: Judgment normal.                 ECG 12 Lead    Date/Time: 2/8/2022 9:23 AM  Performed by: Codey Josue PA  Authorized by: Codey Josue PA   Rhythm: sinus rhythm  Rate: normal  Conduction: conduction normal  ST Segments: ST segments normal  T Waves: T waves normal  QRS axis: normal    Clinical impression: non-specific ECG          HEALTH RISK ASSESSMENT    Smoking Status:  Social History     Tobacco Use   Smoking Status Former Smoker   Smokeless Tobacco Never Used     Alcohol Consumption:  Social History     Substance and Sexual Activity   Alcohol Use No     Fall Risk Screen:    STEADI Fall Risk Assessment has not been completed.    Depression Screening:  PHQ-2/PHQ-9 Depression Screening 2/8/2022   Little interest or pleasure in doing things 0   Feeling down, depressed, or hopeless 0   Total Score 0       Health Habits and Functional and Cognitive Screening:  Functional & Cognitive Status 2/8/2022   Do you have difficulty preparing food and eating? No   Do you have difficulty bathing yourself, getting dressed or grooming yourself? No   Do you have difficulty using the toilet? No   Do you have difficulty moving around from place to place? No   Do you have trouble with steps or getting out of a bed or a chair? No   Current Diet Well Balanced Diet   Dental Exam Not up to date   Eye  Exam Up to date   Exercise (times per week) 7 times per week   Current Exercises Include Walking;House Cleaning   Current Exercise Activities Include -   Do you need help using the phone?  No   Are you deaf or do you have serious difficulty hearing?  No   Do you need help with transportation? No   Do you need help shopping? No   Do you need help preparing meals?  No   Do you need help with housework?  No   Do you need help with laundry? No   Do you need help taking your medications? No   Do you need help managing money? No   Do you ever drive or ride in a car without wearing a seat belt? No   Have you felt unusual stress, anger or loneliness in the last month? No   Who do you live with? Alone   If you need help, do you have trouble finding someone available to you? No   Have you been bothered in the last four weeks by sexual problems? No   Do you have difficulty concentrating, remembering or making decisions? No       Age-appropriate Screening Schedule:  Refer to the list below for future screening recommendations based on patient's age, sex and/or medical conditions. Orders for these recommended tests are listed in the plan section. The patient has been provided with a written plan.    Health Maintenance   Topic Date Due   • DIABETIC EYE EXAM  08/20/2021   • ZOSTER VACCINE (1 of 2) 02/08/2022 (Originally 1/4/2011)   • INFLUENZA VACCINE  11/08/2022 (Originally 8/1/2021)   • TDAP/TD VACCINES (1 - Tdap) 02/08/2023 (Originally [birth year]/4/1980)   • HEMOGLOBIN A1C  05/08/2022   • URINE MICROALBUMIN  11/08/2022   • DIABETIC FOOT EXAM  Discontinued              Assessment/Plan     CMS Preventative Services Quick Reference  Risk Factors Identified During Encounter  Cardiovascular Disease  Obesity/Overweight   The above risks/problems have been discussed with the patient.  Follow up actions/plans if indicated are seen below in the Assessment/Plan Section.  Pertinent information has been shared with the patient in the After Visit  Summary.    Diagnoses and all orders for this visit:    1. Encounter for subsequent annual wellness visit in Medicare patient (Primary)    2. Routine general medical examination at a health care facility  -     Hemoglobin A1c; Future  -     Comprehensive Metabolic Panel; Future  -     Lipid Panel; Future  -     MicroAlbumin, Urine, Random - Urine, Clean Catch; Future  -     TSH; Future    3. Morbid obesity (HCC)    4. 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  Discussed nutrition, physical activity, healthy weight, injury prevention, misuse of tobacco, alcohol and drugs, dental health, mental health, immunizations, screening    Part of this note may be an electronic transcription/translation of spoken language to printed text using the Dragon Dictation System.    Follow Up:  No follow-ups on file.     An After Visit Summary and PPPS were given to the patient.

## 2022-02-09 ENCOUNTER — LAB (OUTPATIENT)
Dept: LAB | Facility: HOSPITAL | Age: 62
End: 2022-02-09

## 2022-02-09 DIAGNOSIS — Z00.00 ROUTINE GENERAL MEDICAL EXAMINATION AT A HEALTH CARE FACILITY: ICD-10-CM

## 2022-02-09 DIAGNOSIS — E08.311 DIABETES MELLITUS DUE TO UNDERLYING CONDITION WITH RETINOPATHY AND MACULAR EDEMA, WITHOUT LONG-TERM CURRENT USE OF INSULIN, UNSPECIFIED LATERALITY, UNSPECIFIED RETINOPATHY SEVERITY: ICD-10-CM

## 2022-02-10 LAB
ALBUMIN SERPL-MCNC: 4.7 G/DL (ref 3.5–5.2)
ALBUMIN/GLOB SERPL: 1.8 G/DL
ALP SERPL-CCNC: 58 U/L (ref 39–117)
ALT SERPL-CCNC: 36 U/L (ref 1–41)
AST SERPL-CCNC: 19 U/L (ref 1–40)
BILIRUB SERPL-MCNC: 0.5 MG/DL (ref 0–1.2)
BUN SERPL-MCNC: 9 MG/DL (ref 8–23)
BUN/CREAT SERPL: 11.1 (ref 7–25)
CALCIUM SERPL-MCNC: 9.6 MG/DL (ref 8.6–10.5)
CHLORIDE SERPL-SCNC: 100 MMOL/L (ref 98–107)
CHOLEST SERPL-MCNC: 120 MG/DL (ref 0–200)
CO2 SERPL-SCNC: 26.6 MMOL/L (ref 22–29)
CREAT SERPL-MCNC: 0.81 MG/DL (ref 0.76–1.27)
GLOBULIN SER CALC-MCNC: 2.6 GM/DL
GLUCOSE SERPL-MCNC: 204 MG/DL (ref 65–99)
HBA1C MFR BLD: 7.8 % (ref 4.8–5.6)
HDLC SERPL-MCNC: 40 MG/DL (ref 40–60)
LDLC SERPL CALC-MCNC: 54 MG/DL (ref 0–100)
MICROALBUMIN UR-MCNC: 4.8 UG/ML
POTASSIUM SERPL-SCNC: 4.8 MMOL/L (ref 3.5–5.2)
PROT SERPL-MCNC: 7.3 G/DL (ref 6–8.5)
SODIUM SERPL-SCNC: 136 MMOL/L (ref 136–145)
TRIGL SERPL-MCNC: 153 MG/DL (ref 0–150)
TSH SERPL DL<=0.005 MIU/L-ACNC: 0.51 UIU/ML (ref 0.27–4.2)
VLDLC SERPL CALC-MCNC: 26 MG/DL (ref 5–40)

## 2022-05-09 ENCOUNTER — OFFICE VISIT (OUTPATIENT)
Dept: FAMILY MEDICINE CLINIC | Facility: CLINIC | Age: 62
End: 2022-05-09

## 2022-05-09 VITALS
DIASTOLIC BLOOD PRESSURE: 78 MMHG | BODY MASS INDEX: 47.93 KG/M2 | TEMPERATURE: 97.1 F | SYSTOLIC BLOOD PRESSURE: 140 MMHG | OXYGEN SATURATION: 98 % | RESPIRATION RATE: 16 BRPM | WEIGHT: 305.4 LBS | HEIGHT: 67 IN | HEART RATE: 83 BPM

## 2022-05-09 DIAGNOSIS — E11.9 TYPE 2 DIABETES MELLITUS WITHOUT COMPLICATION, WITHOUT LONG-TERM CURRENT USE OF INSULIN: Primary | ICD-10-CM

## 2022-05-09 PROCEDURE — 99212 OFFICE O/P EST SF 10 MIN: CPT | Performed by: PHYSICIAN ASSISTANT

## 2022-05-09 NOTE — PROGRESS NOTES
Subjective   Chandler Mar is a 62 y.o. male  Diabetes (FBS at home 235. )      History of Present Illness  The patient reports that he is doing well. His A1c is 7.5 percent today, 05/09/2022, which is down from 7.8 percent in 02/2022. His blood sugar was slightly elevated this morning, it was 230 mg/dL this morning. The patient reports that he has been drinking more water. He states that he has been eating more fiber. The patient notes that he has stopped eating bread. He reports that he has been eating wheat, flaxseed oil, and Omega-3. The patient reports that he has been eating more fish.    The following portions of the patient's history were reviewed and updated as appropriate: allergies, current medications, past social history and problem list    Review of Systems   Constitutional: Negative for appetite change, diaphoresis, fatigue and unexpected weight change.   Eyes: Negative for visual disturbance.   Respiratory: Negative for cough, chest tightness and shortness of breath.    Cardiovascular: Negative for chest pain, palpitations and leg swelling.   Gastrointestinal: Negative for diarrhea, nausea and vomiting.   Endocrine: Negative for polydipsia, polyphagia and polyuria.   Skin: Negative for color change and rash.   Neurological: Negative for dizziness, syncope, weakness, light-headedness, numbness and headaches.       Objective     Vitals:    05/09/22 1046   BP: 140/78   Pulse: 83   Resp: 16   Temp: 97.1 °F (36.2 °C)   SpO2: 98%       Physical Exam  Vitals and nursing note reviewed.   Constitutional:       General: He is not in acute distress.     Appearance: Normal appearance. He is well-developed. He is not ill-appearing, toxic-appearing or diaphoretic.   Neck:      Thyroid: No thyromegaly.      Vascular: No carotid bruit or JVD.   Cardiovascular:      Rate and Rhythm: Normal rate and regular rhythm.      Pulses: Normal pulses.      Heart sounds: Normal heart sounds. No murmur heard.  Pulmonary:       Effort: Pulmonary effort is normal. No respiratory distress.      Breath sounds: Normal breath sounds.   Abdominal:      Palpations: Abdomen is soft. There is no mass.      Tenderness: There is no abdominal tenderness.   Musculoskeletal:      Cervical back: Neck supple.   Lymphadenopathy:      Cervical: No cervical adenopathy.   Skin:     General: Skin is warm and dry.   Neurological:      Mental Status: He is alert.      Sensory: No sensory deficit.         Assessment/Plan     Diagnoses and all orders for this visit:    1. Type 2 diabetes mellitus without complication, without long-term current use of insulin ([facility]) (Primary)  Assessment & Plan:  - Continue with diet and exercise. A1c has improved.     I spent 15 minutes in patient care: Reviewing records prior to the visit, examining the patient, entering orders and documentation    Part of this note may be an electronic transcription/translation of spoken language to printed text using the Dragon Dictation System.      Transcribed from ambient dictation for DAI Worrell by Yamilex Pacheco.  05/09/22   11:40 EDT    Patient verbalized consent to the visit recording.  I have personally performed the services described in this document as transcribed by the above individual, and it is both accurate and complete.  DAI Worrell  5/10/2022  09:22 EDT

## 2022-06-13 DIAGNOSIS — E11.9 TYPE 2 DIABETES MELLITUS WITHOUT COMPLICATION, WITHOUT LONG-TERM CURRENT USE OF INSULIN: ICD-10-CM

## 2022-06-13 DIAGNOSIS — I10 ESSENTIAL HYPERTENSION: ICD-10-CM

## 2022-06-14 RX ORDER — GLIMEPIRIDE 4 MG/1
TABLET ORAL
Qty: 90 TABLET | Refills: 3 | Status: SHIPPED | OUTPATIENT
Start: 2022-06-14

## 2022-06-14 RX ORDER — SIMVASTATIN 10 MG
TABLET ORAL
Qty: 90 TABLET | Refills: 3 | Status: SHIPPED | OUTPATIENT
Start: 2022-06-14

## 2022-06-15 DIAGNOSIS — Z12.11 COLON CANCER SCREENING: Primary | ICD-10-CM

## 2022-06-29 DIAGNOSIS — I10 PRIMARY HYPERTENSION: ICD-10-CM

## 2022-06-30 ENCOUNTER — OUTSIDE FACILITY SERVICE (OUTPATIENT)
Dept: GASTROENTEROLOGY | Facility: CLINIC | Age: 62
End: 2022-06-30

## 2022-06-30 PROCEDURE — 88305 TISSUE EXAM BY PATHOLOGIST: CPT | Performed by: INTERNAL MEDICINE

## 2022-06-30 PROCEDURE — 45385 COLONOSCOPY W/LESION REMOVAL: CPT | Performed by: INTERNAL MEDICINE

## 2022-06-30 RX ORDER — LISINOPRIL 10 MG/1
TABLET ORAL
Qty: 90 TABLET | Refills: 3 | Status: SHIPPED | OUTPATIENT
Start: 2022-06-30

## 2022-07-01 ENCOUNTER — LAB REQUISITION (OUTPATIENT)
Dept: LAB | Facility: HOSPITAL | Age: 62
End: 2022-07-01

## 2022-07-01 DIAGNOSIS — D12.3 BENIGN NEOPLASM OF TRANSVERSE COLON: ICD-10-CM

## 2022-07-01 DIAGNOSIS — Z86.010 PERSONAL HISTORY OF COLONIC POLYPS: ICD-10-CM

## 2022-07-01 DIAGNOSIS — D12.2 BENIGN NEOPLASM OF ASCENDING COLON: ICD-10-CM

## 2022-07-01 DIAGNOSIS — K64.8 OTHER HEMORRHOIDS: ICD-10-CM

## 2022-07-01 DIAGNOSIS — K57.30 DIVERTICULOSIS OF LARGE INTESTINE WITHOUT PERFORATION OR ABSCESS WITHOUT BLEEDING: ICD-10-CM

## 2022-07-01 DIAGNOSIS — D12.8 BENIGN NEOPLASM OF RECTUM: ICD-10-CM

## 2022-07-01 DIAGNOSIS — D12.4 BENIGN NEOPLASM OF DESCENDING COLON: ICD-10-CM

## 2022-07-08 ENCOUNTER — TELEPHONE (OUTPATIENT)
Dept: GASTROENTEROLOGY | Facility: CLINIC | Age: 62
End: 2022-07-08

## 2022-07-08 NOTE — TELEPHONE ENCOUNTER
----- Message from Nayan Stoll MD sent at 7/7/2022  4:37 PM EDT -----  Let Mr. Mar know the polyps were adenoma and serrated adenoma type.  He will need a repeat examination in 3 years.

## 2022-08-09 ENCOUNTER — OFFICE VISIT (OUTPATIENT)
Dept: FAMILY MEDICINE CLINIC | Facility: CLINIC | Age: 62
End: 2022-08-09

## 2022-08-09 VITALS
HEIGHT: 67 IN | BODY MASS INDEX: 47.49 KG/M2 | OXYGEN SATURATION: 97 % | RESPIRATION RATE: 16 BRPM | TEMPERATURE: 97.3 F | DIASTOLIC BLOOD PRESSURE: 82 MMHG | WEIGHT: 302.6 LBS | HEART RATE: 75 BPM | SYSTOLIC BLOOD PRESSURE: 136 MMHG

## 2022-08-09 DIAGNOSIS — E11.65 TYPE 2 DIABETES MELLITUS WITH HYPERGLYCEMIA, WITHOUT LONG-TERM CURRENT USE OF INSULIN: Primary | ICD-10-CM

## 2022-08-09 LAB
EXPIRATION DATE: NORMAL
GLUCOSE BLDC GLUCOMTR-MCNC: 198 MG/DL (ref 70–130)
HBA1C MFR BLD: 7.2 %
Lab: NORMAL

## 2022-08-09 PROCEDURE — 82962 GLUCOSE BLOOD TEST: CPT | Performed by: PHYSICIAN ASSISTANT

## 2022-08-09 PROCEDURE — 3051F HG A1C>EQUAL 7.0%<8.0%: CPT | Performed by: PHYSICIAN ASSISTANT

## 2022-08-09 PROCEDURE — 99213 OFFICE O/P EST LOW 20 MIN: CPT | Performed by: PHYSICIAN ASSISTANT

## 2022-08-09 PROCEDURE — 83036 HEMOGLOBIN GLYCOSYLATED A1C: CPT | Performed by: PHYSICIAN ASSISTANT

## 2022-08-09 NOTE — PROGRESS NOTES
Subjective   Chandler Mar is a 62 y.o. male  Diabetes (, A1C 7.2%)      History of Present Illness  Patient comes in for follow-up of type 2 diabetes A1c is 7.2 today improved from previous visit patient states he been try to watch his diet and exercise no short of breath, no chest pain meds working well  The following portions of the patient's history were reviewed and updated as appropriate: allergies, current medications, past social history and problem list    Review of Systems   Constitutional: Negative for appetite change, diaphoresis, fatigue and unexpected weight change.   Eyes: Negative for visual disturbance.   Respiratory: Negative for chest tightness and shortness of breath.    Cardiovascular: Negative for chest pain, palpitations and leg swelling.   Gastrointestinal: Negative for diarrhea, nausea and vomiting.   Endocrine: Negative for polydipsia, polyphagia and polyuria.   Skin: Negative for color change.   Neurological: Negative for dizziness, weakness, light-headedness and numbness.       Objective     Vitals:    08/09/22 1038   BP: 136/82   Pulse: 75   Resp: 16   Temp: 97.3 °F (36.3 °C)   SpO2: 97%       Physical Exam  Vitals and nursing note reviewed.   Constitutional:       General: He is not in acute distress.     Appearance: Normal appearance. He is well-developed. He is not ill-appearing, toxic-appearing or diaphoretic.   Neck:      Thyroid: No thyromegaly.      Vascular: No JVD.   Cardiovascular:      Rate and Rhythm: Normal rate and regular rhythm.      Pulses: Normal pulses.      Heart sounds: Normal heart sounds. No murmur heard.  Pulmonary:      Effort: Pulmonary effort is normal.      Breath sounds: Normal breath sounds.   Abdominal:      Palpations: Abdomen is soft. There is no mass.      Tenderness: There is no abdominal tenderness.   Musculoskeletal:      Cervical back: Neck supple.   Lymphadenopathy:      Cervical: No cervical adenopathy.   Skin:     General: Skin is warm and  dry.   Neurological:      Mental Status: He is alert.      Sensory: No sensory deficit.         Assessment & Plan     Diagnoses and all orders for this visit:    1. Type 2 diabetes mellitus with hyperglycemia, without long-term current use of insulin (HCC) (Primary)  -     POCT Glucose  -     POC Glycosylated Hemoglobin (Hb A1C)    Continue with diet and exercise recheck in 6 months, or fat diet diet increase exercise continue all meds  I spent 15 minutes in patient care: Reviewing records prior to the visit, examining the patient, entering orders and documentation    Part of this note may be an electronic transcription/translation of spoken language to printed text using the Dragon Dictation System.

## 2022-08-16 ENCOUNTER — OFFICE VISIT (OUTPATIENT)
Dept: SLEEP MEDICINE | Facility: HOSPITAL | Age: 62
End: 2022-08-16

## 2022-08-16 VITALS
SYSTOLIC BLOOD PRESSURE: 141 MMHG | HEIGHT: 67 IN | WEIGHT: 299 LBS | BODY MASS INDEX: 46.93 KG/M2 | OXYGEN SATURATION: 98 % | DIASTOLIC BLOOD PRESSURE: 69 MMHG | HEART RATE: 73 BPM

## 2022-08-16 DIAGNOSIS — G47.33 OSA (OBSTRUCTIVE SLEEP APNEA): Primary | ICD-10-CM

## 2022-08-16 PROCEDURE — 99213 OFFICE O/P EST LOW 20 MIN: CPT | Performed by: NURSE PRACTITIONER

## 2022-08-16 NOTE — PROGRESS NOTES
Chief Complaint:   Chief Complaint   Patient presents with   • Follow-up       HPI:    Chandler Mar is a 62 y.o. male here for follow-up of severe sleep apnea.  Patient was last seen 8/17/2021.  Patient continues to do well with CPAP therapy.  He is sleeping 6 to 9 hours nightly and does feel rested upon awakening.  He goes to sleep within 5 minutes and very rarely will get up during the night.  Patient has an [place] score of 3/24.  He has no concerns or complaints and wishes to continue therapy.        Current medications are:   Current Outpatient Medications:   •  Accu-Chek Stacy Plus test strip, TEST BLOOD SUGAR THREE TIMES DAILY, Disp: 300 each, Rfl: 11  •  Accu-Chek Softclix Lancets lancets, USE TO TEST BLOOD SUGAR THREE TIMES DAILY, Disp: 300 each, Rfl: 11  •  Accu-Chek Softclix Lancets lancets, USE TO TEST BLOOD SUGAR THREE TIMES DAILY, Disp: 300 each, Rfl: 11  •  Alcohol Swabs (B-D SINGLE USE SWABS REGULAR) pads, USE TO CLEAN FINGER PRIOR TO CHECKING BLOOD SUGAR THREE TIMES DAILY, Disp: 300 each, Rfl: 11  •  Alcohol Swabs (B-D SINGLE USE SWABS REGULAR) pads, USE TO CLEAN FINGER PRIOR TO CHECKING BLOOD SUGAR THREE TIMES DAILY, Disp: 300 each, Rfl: 11  •  Blood Glucose Calibration (Glucose Control) solution, Use to calibrate testing supplies. Dx E11.65, Disp: 1 each, Rfl: 11  •  glimepiride (AMARYL) 4 MG tablet, TAKE 1 TABLET IN THE MORNING BEFORE BREAKFAST, Disp: 90 tablet, Rfl: 3  •  glucose blood (Accu-Chek Stacy Plus) test strip, TEST BLOOD SUGAR THREE TIMES DAILY, Disp: 300 each, Rfl: 11  •  glucose monitor monitoring kit, Use to check blood sugar three times daily. Dx E11.9, Disp: 1 each, Rfl: 0  •  latanoprost (XALATAN) 0.005 % ophthalmic solution, , Disp: , Rfl:   •  levothyroxine (SYNTHROID, LEVOTHROID) 150 MCG tablet, Take 1 tablet by mouth Daily., Disp: 90 tablet, Rfl: 3  •  lisinopril (PRINIVIL,ZESTRIL) 10 MG tablet, TAKE 1 TABLET EVERY DAY, Disp: 90 tablet, Rfl: 3  •  metFORMIN (GLUCOPHAGE) 1000  "MG tablet, TAKE 1 TABLET TWICE DAILY WITH MEALS, Disp: 180 tablet, Rfl: 3  •  metoprolol tartrate (LOPRESSOR) 25 MG tablet, TAKE ONE TABLET BY MOUTH TWICE DAILY. MAY BREAK IN HALF IF NEEDED., Disp: 180 tablet, Rfl: 3  •  simvastatin (ZOCOR) 10 MG tablet, TAKE 1 TABLET EVERY DAY, Disp: 90 tablet, Rfl: 3  •  timolol (TIMOPTIC) 0.5 % ophthalmic solution, , Disp: , Rfl:   •  trimethoprim-polymyxin b (POLYTRIM) 43516-4.1 UNIT/ML-% ophthalmic solution, , Disp: , Rfl: .      The patient's relevant past medical, surgical, family and social history were reviewed and updated in Epic as appropriate.       Review of Systems   HENT: Positive for hearing loss.    Eyes: Positive for visual disturbance.   Respiratory: Positive for apnea.    Gastrointestinal: Positive for constipation and diarrhea.   Musculoskeletal: Positive for arthralgias, gait problem and joint swelling.   Allergic/Immunologic: Positive for environmental allergies.   Neurological: Positive for numbness.   Psychiatric/Behavioral: Positive for sleep disturbance.   All other systems reviewed and are negative.        Objective:    Physical Exam  Constitutional:       Appearance: Normal appearance.   HENT:      Head: Normocephalic and atraumatic.      Mouth/Throat:      Comments: Mallampati 4 anatomy  Cardiovascular:      Rate and Rhythm: Normal rate and regular rhythm.   Pulmonary:      Effort: Pulmonary effort is normal.      Breath sounds: Normal breath sounds.   Skin:     General: Skin is warm and dry.   Neurological:      Mental Status: He is alert and oriented to person, place, and time.   Psychiatric:         Mood and Affect: Mood normal.         Behavior: Behavior normal.         Thought Content: Thought content normal.         Judgment: Judgment normal.     /69   Pulse 73   Ht 170.2 cm (67\")   Wt 136 kg (299 lb)   SpO2 98%   BMI 46.83 kg/m²       CPAP Report  89/90 days of use  Greater than 4-hour use 98.9  90% pressure 15.5  AHI 1.3  Settings " 14-20    The patient continues to use and benefit from CPAP therapy.    ASSESSMENT/PLAN    Diagnoses and all orders for this visit:    1. LUISANA (obstructive sleep apnea) (Primary)  -     PAP Therapy        1. Counseled patient regarding multimodal approach with healthy nutrition, healthy sleep, regular physical activity, social activities, counseling, and medications. Encouraged to practice lateral sleep position. Avoid alcohol and sedatives close to bedtime.  2.   Refill supplies x1 year.  Return to clinic 1 year or sooner symptoms warrant.    I have reviewed the results of my evaluation and impression and discussed my recommendations in detail with the patient.      Signed by  ANTHONY Morris    August 16, 2022      CC: Codey Josue PA         No ref. provider found

## 2022-08-29 DIAGNOSIS — E03.9 HYPOTHYROIDISM, UNSPECIFIED TYPE: ICD-10-CM

## 2022-08-30 RX ORDER — LEVOTHYROXINE SODIUM 0.15 MG/1
TABLET ORAL
Qty: 90 TABLET | Refills: 3 | Status: SHIPPED | OUTPATIENT
Start: 2022-08-30

## 2022-10-26 DIAGNOSIS — E11.9 TYPE 2 DIABETES MELLITUS WITHOUT COMPLICATION, WITHOUT LONG-TERM CURRENT USE OF INSULIN: ICD-10-CM

## 2023-01-06 RX ORDER — BLOOD SUGAR DIAGNOSTIC
STRIP MISCELLANEOUS
Qty: 300 EACH | Refills: 3 | Status: SHIPPED | OUTPATIENT
Start: 2023-01-06

## 2023-01-16 RX ORDER — LANCETS
EACH MISCELLANEOUS
Qty: 300 EACH | Refills: 11 | Status: SHIPPED | OUTPATIENT
Start: 2023-01-16

## 2023-02-05 NOTE — TELEPHONE ENCOUNTER
Patient was prescribed Flonase and doxy March 27. Allergic to PCN   48 M with no PMH post procedural day 2 from ERCP with stent placement and biopsy. Patient is doing well, no abdominal pain:   - Continue regular diet   - Send amoxicillin for UTI   - DVT prophylaxis: SCDs, encourage ambulation  - Pain control as needed   - D/c this afternoon if patient continues to tolerate diet   - Daily labs if patient stays, will start lovenox if patient is not discharged  48M with no sig pmhx presenting for painless jaundice with biliary stricture s/p EUS/ERCP Spyglass cholangioscopy, biopsies, and stenting. Doing well. LFts downtrending.  - F/u pathology results.  - HPB surgery f/u.  - Ok for discharge from GI standpoint.  - Reconsult inpatient GI PRN.  48 M with no PMH post procedural day 1 from ERCP with stent placement and biopsy. Patient is doing well, no abdominal pain:   - Advance to regular diet   - Continue ceftriaxone   - DVT prophylaxis: SCDs, encourage ambulation, start lovenox possibly tomorrow if patient not to be discharged   - Pain control as needed   - Likely d/c tomorrow vs monday   - Daily labs

## 2023-02-08 RX ORDER — ISOPROPYL ALCOHOL 70 ML/100ML
SWAB TOPICAL
Qty: 300 EACH | Refills: 11 | Status: SHIPPED | OUTPATIENT
Start: 2023-02-08

## 2023-02-09 ENCOUNTER — LAB (OUTPATIENT)
Dept: LAB | Facility: HOSPITAL | Age: 63
End: 2023-02-09
Payer: MEDICARE

## 2023-02-09 ENCOUNTER — OFFICE VISIT (OUTPATIENT)
Dept: FAMILY MEDICINE CLINIC | Facility: CLINIC | Age: 63
End: 2023-02-09
Payer: MEDICARE

## 2023-02-09 VITALS
WEIGHT: 306.6 LBS | DIASTOLIC BLOOD PRESSURE: 78 MMHG | SYSTOLIC BLOOD PRESSURE: 136 MMHG | HEIGHT: 67 IN | HEART RATE: 70 BPM | OXYGEN SATURATION: 97 % | BODY MASS INDEX: 48.12 KG/M2 | RESPIRATION RATE: 16 BRPM | TEMPERATURE: 97.6 F

## 2023-02-09 DIAGNOSIS — E66.01 MORBID OBESITY: ICD-10-CM

## 2023-02-09 DIAGNOSIS — Z00.00 ROUTINE GENERAL MEDICAL EXAMINATION AT A HEALTH CARE FACILITY: ICD-10-CM

## 2023-02-09 DIAGNOSIS — E11.65 TYPE 2 DIABETES MELLITUS WITH HYPERGLYCEMIA, WITHOUT LONG-TERM CURRENT USE OF INSULIN: ICD-10-CM

## 2023-02-09 DIAGNOSIS — Z00.00 MEDICARE ANNUAL WELLNESS VISIT, SUBSEQUENT: Primary | ICD-10-CM

## 2023-02-09 PROCEDURE — 1159F MED LIST DOCD IN RCRD: CPT | Performed by: PHYSICIAN ASSISTANT

## 2023-02-09 PROCEDURE — 1170F FXNL STATUS ASSESSED: CPT | Performed by: PHYSICIAN ASSISTANT

## 2023-02-09 PROCEDURE — G0439 PPPS, SUBSEQ VISIT: HCPCS | Performed by: PHYSICIAN ASSISTANT

## 2023-02-09 PROCEDURE — 99396 PREV VISIT EST AGE 40-64: CPT | Performed by: PHYSICIAN ASSISTANT

## 2023-02-09 PROCEDURE — 96160 PT-FOCUSED HLTH RISK ASSMT: CPT | Performed by: PHYSICIAN ASSISTANT

## 2023-02-09 NOTE — PROGRESS NOTES
The ABCs of the Annual Wellness Visit  Subsequent Medicare Wellness Visit    Chief Complaint   Patient presents with   • Medicare Wellness-subsequent     UTD on colonoscopy. Declines vaccines. Fasting for labs.   • Diabetes     A1C 7.4%      Subjective   History of Present Illness:  Chandler Mar is a 63 y.o. male who presents for a Subsequent Medicare Wellness Visit.  Patient 61-year-old white male who comes as a subsequent medical wellness visit also preventive medical exam has type 2 diabetes A1c today 7.4  The following portions of the patient's history were reviewed and   updated as appropriate: allergies, current medications, past family history, past medical history, past social history, past surgical history and problem list.    Compared to one year ago, the patient feels his physical   health is better.    Compared to one year ago, the patient feels his mental   health is better.    Recent Hospitalizations:  He was not admitted to the hospital during the last year.       Current Medical Providers:  Patient Care Team:  Codey Josue PA as PCP - General (Family Medicine)  Favian Mojica MD as Surgeon (Neurosurgery)  Brandon Saleh MD as Consulting Physician (Anesthesiology)  Nayan Stoll MD as Consulting Physician (Gastroenterology)    Outpatient Medications Prior to Visit   Medication Sig Dispense Refill   • Accu-Chek Stacy Plus test strip TEST BLOOD SUGAR THREE TIMES DAILY 300 each 3   • Accu-Chek Softclix Lancets lancets USE TO TEST BLOOD SUGAR THREE TIMES DAILY 300 each 11   • Accu-Chek Softclix Lancets lancets USE TO TEST BLOOD SUGAR THREE TIMES DAILY 300 each 11   • Alcohol Swabs (B-D SINGLE USE SWABS REGULAR) pads USE TO CLEAN FINGER PRIOR TO CHECKING BLOOD SUGAR THREE TIMES DAILY 300 each 11   • Alcohol Swabs (DropSafe Alcohol Prep) 70 % pads USE TO CLEAN FINGER PRIOR TO CHECKING BLOOD SUGAR THREE TIMES DAILY 300 each 11   • Blood Glucose Calibration (Glucose Control) solution Use  to calibrate testing supplies. Dx E11.65 1 each 11   • glimepiride (AMARYL) 4 MG tablet TAKE 1 TABLET IN THE MORNING BEFORE BREAKFAST 90 tablet 3   • glucose blood (Accu-Chek Stacy Plus) test strip TEST BLOOD SUGAR THREE TIMES DAILY 300 each 11   • glucose monitor monitoring kit Use to check blood sugar three times daily. Dx E11.9 1 each 0   • latanoprost (XALATAN) 0.005 % ophthalmic solution      • levothyroxine (SYNTHROID, LEVOTHROID) 150 MCG tablet TAKE 1 TABLET EVERY DAY 90 tablet 3   • lisinopril (PRINIVIL,ZESTRIL) 10 MG tablet TAKE 1 TABLET EVERY DAY 90 tablet 3   • metFORMIN (GLUCOPHAGE) 1000 MG tablet TAKE 1 TABLET TWICE DAILY  WITH  MEALS 180 tablet 3   • metoprolol tartrate (LOPRESSOR) 25 MG tablet TAKE ONE TABLET BY MOUTH TWICE DAILY. MAY BREAK IN HALF IF NEEDED. 180 tablet 3   • simvastatin (ZOCOR) 10 MG tablet TAKE 1 TABLET EVERY DAY 90 tablet 3   • timolol (TIMOPTIC) 0.5 % ophthalmic solution      • trimethoprim-polymyxin b (POLYTRIM) 14200-8.1 UNIT/ML-% ophthalmic solution        No facility-administered medications prior to visit.       No opioid medication identified on active medication list. I have reviewed chart for other potential  high risk medication/s and harmful drug interactions in the elderly.          Aspirin is not on active medication list.  Aspirin use is not indicated based on review of current medical condition/s. Risk of harm outweighs potential benefits.  .    Patient Active Problem List   Diagnosis   • Back pain   • Essential hypertension   • Type 2 diabetes mellitus without complication, without long-term current use of insulin (HCC)   • Morbid obesity (HCC)   • Severe obstructive sleep apnea     Advance Care Planning  has NO advanced directive - information provided to the patient today    Review of Systems   Constitutional: Negative.    HENT: Negative.    Eyes: Negative.    Respiratory: Negative.    Cardiovascular: Negative.    Gastrointestinal: Negative.    Endocrine:  "Negative.    Genitourinary: Negative.    Musculoskeletal: Negative.    Skin: Negative.    Allergic/Immunologic: Negative.    Neurological: Negative.    Hematological: Negative.    Psychiatric/Behavioral: Negative.    All other systems reviewed and are negative.        Objective      Vitals:    02/09/23 1038   BP: 136/78   Pulse: 70   Resp: 16   Temp: 97.6 °F (36.4 °C)   TempSrc: Infrared   SpO2: 97%   Weight: (!) 139 kg (306 lb 9.6 oz)   Height: 170.2 cm (67.01\")   PainSc: 0-No pain     BMI Readings from Last 1 Encounters:   02/09/23 48.01 kg/m²   BMI is above normal parameters. Recommendations include: educational material and exercise counseling    Does the patient have evidence of cognitive impairment? No    Physical Exam  Vitals and nursing note reviewed.   Constitutional:       General: He is not in acute distress.     Appearance: Normal appearance. He is well-developed. He is not ill-appearing, toxic-appearing or diaphoretic.   HENT:      Head: Normocephalic and atraumatic.      Right Ear: External ear normal.      Left Ear: External ear normal.   Eyes:      Conjunctiva/sclera: Conjunctivae normal.      Pupils: Pupils are equal, round, and reactive to light.   Neck:      Thyroid: No thyromegaly.      Vascular: No carotid bruit.   Cardiovascular:      Rate and Rhythm: Normal rate and regular rhythm.      Pulses: Normal pulses.      Heart sounds: Normal heart sounds. No murmur heard.  Pulmonary:      Effort: Pulmonary effort is normal. No respiratory distress.      Breath sounds: Normal breath sounds.   Abdominal:      General: Bowel sounds are normal.      Palpations: Abdomen is soft. There is no mass.      Tenderness: There is no abdominal tenderness.   Musculoskeletal:         General: No swelling. Normal range of motion.      Cervical back: Normal range of motion and neck supple.   Lymphadenopathy:      Cervical: No cervical adenopathy.   Skin:     General: Skin is warm and dry.      Findings: No lesion or " rash.   Neurological:      Mental Status: He is alert and oriented to person, place, and time.      Cranial Nerves: No cranial nerve deficit.      Sensory: No sensory deficit.      Motor: No weakness.      Coordination: Coordination normal.      Gait: Gait normal.      Deep Tendon Reflexes: Reflexes are normal and symmetric.   Psychiatric:         Mood and Affect: Mood normal.         Behavior: Behavior normal.         Thought Content: Thought content normal.         Judgment: Judgment normal.                 HEALTH RISK ASSESSMENT    Smoking Status:  Social History     Tobacco Use   Smoking Status Former   • Types: Cigarettes   Smokeless Tobacco Never     Alcohol Consumption:  Social History     Substance and Sexual Activity   Alcohol Use No     Fall Risk Screen:    STEADI Fall Risk Assessment was completed, and patient is at LOW risk for falls.Assessment completed on:2/9/2023    Depression Screening:  PHQ-2/PHQ-9 Depression Screening 2/8/2022   Retired PHQ-9 Total Score 0   Retired Total Score 0       Health Habits and Functional and Cognitive Screening:  Functional & Cognitive Status 2/9/2023   Do you have difficulty preparing food and eating? No   Do you have difficulty bathing yourself, getting dressed or grooming yourself? No   Do you have difficulty using the toilet? No   Do you have difficulty moving around from place to place? No   Do you have trouble with steps or getting out of a bed or a chair? No   Current Diet Well Balanced Diet   Dental Exam Not up to date   Eye Exam Up to date   Exercise (times per week) 5 times per week   Current Exercises Include Walking;Weightlifting   Current Exercise Activities Include -   Do you need help using the phone?  No   Are you deaf or do you have serious difficulty hearing?  No   Do you need help with transportation? No   Do you need help shopping? No   Do you need help preparing meals?  No   Do you need help with housework?  No   Do you need help with laundry? No   Do  you need help taking your medications? No   Do you need help managing money? No   Do you ever drive or ride in a car without wearing a seat belt? No   Have you felt unusual stress, anger or loneliness in the last month? No   Who do you live with? Alone   If you need help, do you have trouble finding someone available to you? No   Have you been bothered in the last four weeks by sexual problems? No   Do you have difficulty concentrating, remembering or making decisions? No       Age-appropriate Screening Schedule:  Refer to the list below for future screening recommendations based on patient's age, sex and/or medical conditions. Orders for these recommended tests are listed in the plan section. The patient has been provided with a written plan.    Health Maintenance   Topic Date Due   • HEMOGLOBIN A1C  02/09/2023   • URINE MICROALBUMIN  02/09/2023   • ZOSTER VACCINE (1 of 2) 02/09/2023 (Originally 1/4/2011)   • INFLUENZA VACCINE  03/31/2023 (Originally 8/1/2022)   • DIABETIC EYE EXAM  09/27/2023 (Originally 8/20/2021)   • TDAP/TD VACCINES (1 - Tdap) 02/09/2024 (Originally 1/4/1980)   • DIABETIC FOOT EXAM  Discontinued              Assessment & Plan     CMS Preventative Services Quick Reference  Risk Factors Identified During Encounter  Immunizations Discussed/Encouraged: Influenza and Pneumococcal 23  The above risks/problems have been discussed with the patient.  Follow up actions/plans if indicated are seen below in the Assessment/Plan Section.  Pertinent information has been shared with the patient in the After Visit Summary.    Diagnoses and all orders for this visit:    1. Medicare annual wellness visit, subsequent (Primary)    2. Routine general medical examination at a health care facility  -     TSH; Future  -     CBC (No Diff); Future    3. Type 2 diabetes mellitus with hyperglycemia, without long-term current use of insulin (HCC)  -     Comprehensive Metabolic Panel; Future  -     Lipid Panel; Future  -      MicroAlbumin, Urine, Random - Urine, Clean Catch; Future    4. Morbid obesity (HCC)        Follow Up:  No follow-ups on file.     An After Visit Summary and PPPS were given to the patient.    Preventive medicine discussed, diet, exercise, healthy living discussed at length.  Discussed nutrition, physical activity, healthy weight, injury prevention, misuse of tobacco, alcohol and drugs, dental health, mental health, immunizations, screening    Part of this note may be an electronic transcription/translation of spoken language to printed text using the Dragon Dictation System.

## 2023-02-10 LAB
ALBUMIN SERPL-MCNC: 4.6 G/DL (ref 3.8–4.8)
ALBUMIN/GLOB SERPL: 1.9 {RATIO} (ref 1.2–2.2)
ALP SERPL-CCNC: 54 IU/L (ref 44–121)
ALT SERPL-CCNC: 50 IU/L (ref 0–44)
AST SERPL-CCNC: 37 IU/L (ref 0–40)
BILIRUB SERPL-MCNC: 0.6 MG/DL (ref 0–1.2)
BUN SERPL-MCNC: 9 MG/DL (ref 8–27)
BUN/CREAT SERPL: 12 (ref 10–24)
CALCIUM SERPL-MCNC: 9.5 MG/DL (ref 8.6–10.2)
CHLORIDE SERPL-SCNC: 99 MMOL/L (ref 96–106)
CHOLEST SERPL-MCNC: 118 MG/DL (ref 100–199)
CO2 SERPL-SCNC: 25 MMOL/L (ref 20–29)
CREAT SERPL-MCNC: 0.74 MG/DL (ref 0.76–1.27)
EGFRCR SERPLBLD CKD-EPI 2021: 102 ML/MIN/1.73
ERYTHROCYTE [DISTWIDTH] IN BLOOD BY AUTOMATED COUNT: 13.6 % (ref 11.6–15.4)
GLOBULIN SER CALC-MCNC: 2.4 G/DL (ref 1.5–4.5)
GLUCOSE SERPL-MCNC: 154 MG/DL (ref 70–99)
HCT VFR BLD AUTO: 41.4 % (ref 37.5–51)
HDLC SERPL-MCNC: 39 MG/DL
HGB BLD-MCNC: 13.8 G/DL (ref 13–17.7)
LDLC SERPL CALC-MCNC: 52 MG/DL (ref 0–99)
MCH RBC QN AUTO: 30.5 PG (ref 26.6–33)
MCHC RBC AUTO-ENTMCNC: 33.3 G/DL (ref 31.5–35.7)
MCV RBC AUTO: 92 FL (ref 79–97)
MICROALBUMIN UR-MCNC: 8.2 UG/ML
PLATELET # BLD AUTO: 220 X10E3/UL (ref 150–450)
POTASSIUM SERPL-SCNC: 4.8 MMOL/L (ref 3.5–5.2)
PROT SERPL-MCNC: 7 G/DL (ref 6–8.5)
RBC # BLD AUTO: 4.52 X10E6/UL (ref 4.14–5.8)
SODIUM SERPL-SCNC: 139 MMOL/L (ref 134–144)
TRIGL SERPL-MCNC: 160 MG/DL (ref 0–149)
TSH SERPL DL<=0.005 MIU/L-ACNC: 0.3 UIU/ML (ref 0.45–4.5)
VLDLC SERPL CALC-MCNC: 27 MG/DL (ref 5–40)
WBC # BLD AUTO: 7.5 X10E3/UL (ref 3.4–10.8)

## 2023-08-09 ENCOUNTER — OFFICE VISIT (OUTPATIENT)
Dept: FAMILY MEDICINE CLINIC | Facility: CLINIC | Age: 63
End: 2023-08-09
Payer: MEDICARE

## 2023-08-09 VITALS
SYSTOLIC BLOOD PRESSURE: 120 MMHG | HEART RATE: 90 BPM | HEIGHT: 67 IN | TEMPERATURE: 97.2 F | RESPIRATION RATE: 20 BRPM | WEIGHT: 283 LBS | BODY MASS INDEX: 44.42 KG/M2 | OXYGEN SATURATION: 98 % | DIASTOLIC BLOOD PRESSURE: 76 MMHG

## 2023-08-09 DIAGNOSIS — E11.9 TYPE 2 DIABETES MELLITUS WITHOUT COMPLICATION, WITHOUT LONG-TERM CURRENT USE OF INSULIN: Primary | ICD-10-CM

## 2023-08-09 LAB
EXPIRATION DATE: NORMAL
HBA1C MFR BLD: 6.9 %
Lab: NORMAL

## 2023-08-09 PROCEDURE — 83036 HEMOGLOBIN GLYCOSYLATED A1C: CPT | Performed by: PHYSICIAN ASSISTANT

## 2023-08-09 PROCEDURE — 3078F DIAST BP <80 MM HG: CPT | Performed by: PHYSICIAN ASSISTANT

## 2023-08-09 PROCEDURE — 3074F SYST BP LT 130 MM HG: CPT | Performed by: PHYSICIAN ASSISTANT

## 2023-08-09 PROCEDURE — 99213 OFFICE O/P EST LOW 20 MIN: CPT | Performed by: PHYSICIAN ASSISTANT

## 2023-08-09 PROCEDURE — 3044F HG A1C LEVEL LT 7.0%: CPT | Performed by: PHYSICIAN ASSISTANT

## 2023-08-09 RX ORDER — VIT C/B6/B5/MAGNESIUM/HERB 173 50-5-6-5MG
CAPSULE ORAL
COMMUNITY

## 2023-08-09 NOTE — PROGRESS NOTES
Subjective   Chandler Mar is a 63 y.o. male  Follow-up  Type 2 diabetes    History of Present Illness  Patient is a 63-year-old white male who has lost over 15 pounds exercising with type 2 diabetes.  Patient's A1c today is 6.9, improved from previous visit patient is watching his diet and exercise closely doing well  The following portions of the patient's history were reviewed and updated as appropriate: allergies, current medications, past social history and problem list    Review of Systems   Constitutional:  Negative for appetite change, diaphoresis, fatigue and unexpected weight change.   Eyes:  Negative for visual disturbance.   Respiratory:  Negative for cough, chest tightness and shortness of breath.    Cardiovascular:  Negative for chest pain, palpitations and leg swelling.   Gastrointestinal:  Negative for diarrhea, nausea and vomiting.   Endocrine: Negative for polydipsia, polyphagia and polyuria.   Skin:  Negative for color change and rash.   Neurological:  Negative for dizziness, syncope, weakness, light-headedness, numbness and headaches.     Objective     Vitals:    08/09/23 1037   BP: 120/76   Pulse: 90   Resp: 20   Temp: 97.2 øF (36.2 øC)   SpO2: 98%       Physical Exam  Vitals and nursing note reviewed.   Constitutional:       General: He is not in acute distress.     Appearance: Normal appearance. He is well-developed. He is not ill-appearing, toxic-appearing or diaphoretic.   Neck:      Thyroid: No thyromegaly.      Vascular: No carotid bruit or JVD.   Cardiovascular:      Rate and Rhythm: Normal rate and regular rhythm.      Pulses: Normal pulses.      Heart sounds: Normal heart sounds. No murmur heard.  Pulmonary:      Effort: Pulmonary effort is normal. No respiratory distress.      Breath sounds: Normal breath sounds.   Abdominal:      Palpations: Abdomen is soft. There is no mass.      Tenderness: There is no abdominal tenderness.   Musculoskeletal:      Cervical back: Neck supple.    Lymphadenopathy:      Cervical: No cervical adenopathy.   Skin:     General: Skin is warm and dry.   Neurological:      Mental Status: He is alert.      Sensory: No sensory deficit.       Assessment & Plan     Diagnoses and all orders for this visit:    1. Type 2 diabetes mellitus without complication, without long-term current use of insulin (Primary)  -     POC Glycosylated Hemoglobin (Hb A1C)     I spent 15 minutes in patient care: Reviewing records prior to the visit, examining the patient, entering orders and documentation    Part of this note may be an electronic transcription/translation of spoken language to printed text using the Dragon Dictation System.

## 2023-08-16 ENCOUNTER — OFFICE VISIT (OUTPATIENT)
Dept: SLEEP MEDICINE | Facility: HOSPITAL | Age: 63
End: 2023-08-16
Payer: MEDICARE

## 2023-08-16 VITALS
DIASTOLIC BLOOD PRESSURE: 58 MMHG | BODY MASS INDEX: 43.95 KG/M2 | OXYGEN SATURATION: 97 % | WEIGHT: 280 LBS | SYSTOLIC BLOOD PRESSURE: 124 MMHG | HEART RATE: 74 BPM | HEIGHT: 67 IN

## 2023-08-16 DIAGNOSIS — G47.33 OSA (OBSTRUCTIVE SLEEP APNEA): Primary | ICD-10-CM

## 2023-08-16 RX ORDER — CHOLECALCIFEROL (VITAMIN D3) 25 MCG
CAPSULE ORAL
COMMUNITY

## 2023-08-16 NOTE — PROGRESS NOTES
Chief Complaint:   Chief Complaint   Patient presents with    Follow-up       HPI:    Chandler Mar is a 63 y.o. male here for follow-up of sleep apnea.  Patient was last seen 8/16/2022.  Patient continues to do well with CPAP therapy.  Patient is sleeping 6 to 9 hours nightly and feels rested upon awakening.  Patient goes to sleep easily and is very rare that he will get up during the night.  Patient has an Organ score of 3/24.  Patient is having no concerns or complaints and wishes to continue CPAP therapy.        Current medications are:   Current Outpatient Medications:     Accu-Chek Stacy Plus test strip, TEST BLOOD SUGAR THREE TIMES DAILY, Disp: 300 each, Rfl: 3    Accu-Chek Softclix Lancets lancets, USE TO TEST BLOOD SUGAR THREE TIMES DAILY, Disp: 300 each, Rfl: 11    Accu-Chek Softclix Lancets lancets, USE TO TEST BLOOD SUGAR THREE TIMES DAILY, Disp: 300 each, Rfl: 11    Alcohol Swabs (B-D SINGLE USE SWABS REGULAR) pads, USE TO CLEAN FINGER PRIOR TO CHECKING BLOOD SUGAR THREE TIMES DAILY, Disp: 300 each, Rfl: 11    Alcohol Swabs (DropSafe Alcohol Prep) 70 % pads, USE TO CLEAN FINGER PRIOR TO CHECKING BLOOD SUGAR THREE TIMES DAILY, Disp: 300 each, Rfl: 11    APPLE CIDER VINEGAR PO, Take  by mouth., Disp: , Rfl:     B Complex Vitamins (VITAMIN B COMPLEX PO), Take  by mouth., Disp: , Rfl:     Blood Glucose Calibration (Glucose Control) solution, Use to calibrate testing supplies. Dx E11.65, Disp: 1 each, Rfl: 11    Cholecalciferol (Vitamin D-3) 25 MCG (1000 UT) capsule, Take  by mouth., Disp: , Rfl:     glimepiride (AMARYL) 4 MG tablet, TAKE 1 TABLET IN THE MORNING BEFORE BREAKFAST, Disp: 90 tablet, Rfl: 3    glucose blood (Accu-Chek Stacy Plus) test strip, TEST BLOOD SUGAR THREE TIMES DAILY, Disp: 300 each, Rfl: 11    glucose monitor monitoring kit, Use to check blood sugar three times daily. Dx E11.9, Disp: 1 each, Rfl: 0    latanoprost (XALATAN) 0.005 % ophthalmic solution, , Disp: , Rfl:     levothyroxine  (SYNTHROID, LEVOTHROID) 150 MCG tablet, TAKE 1 TABLET EVERY DAY, Disp: 90 tablet, Rfl: 3    lisinopril (PRINIVIL,ZESTRIL) 10 MG tablet, TAKE 1 TABLET EVERY DAY, Disp: 90 tablet, Rfl: 3    Magnesium 400 MG capsule, Take  by mouth., Disp: , Rfl:     metFORMIN (GLUCOPHAGE) 1000 MG tablet, TAKE 1 TABLET TWICE DAILY  WITH  MEALS, Disp: 180 tablet, Rfl: 3    metoprolol tartrate (LOPRESSOR) 25 MG tablet, TAKE ONE TABLET TWICE DAILY. MAY BREAK IN HALF IF NEEDED., Disp: 180 tablet, Rfl: 3    Nutritional Supplements (VITAMIN D BOOSTER PO), Take  by mouth., Disp: , Rfl:     simvastatin (ZOCOR) 10 MG tablet, TAKE 1 TABLET EVERY DAY, Disp: 90 tablet, Rfl: 3    timolol (TIMOPTIC) 0.5 % ophthalmic solution, , Disp: , Rfl:     trimethoprim-polymyxin b (POLYTRIM) 43330-1.1 UNIT/ML-% ophthalmic solution, , Disp: , Rfl:     Turmeric 500 MG capsule, Take  by mouth., Disp: , Rfl: .      The patient's relevant past medical, surgical, family and social history were reviewed and updated in Epic as appropriate.       Review of Systems   HENT:  Positive for hearing loss.    Eyes:  Positive for visual disturbance.   Respiratory:  Positive for apnea.    Gastrointestinal:  Positive for constipation and diarrhea.   Musculoskeletal:  Positive for arthralgias, gait problem and joint swelling.   Allergic/Immunologic: Positive for environmental allergies.   Neurological:  Positive for numbness.   Psychiatric/Behavioral:  Positive for sleep disturbance.    All other systems reviewed and are negative.      Objective:    Physical Exam  Constitutional:       Appearance: Normal appearance.   HENT:      Head: Normocephalic and atraumatic.      Mouth/Throat:      Comments: Class 4 airway  Cardiovascular:      Rate and Rhythm: Normal rate and regular rhythm.   Pulmonary:      Effort: Pulmonary effort is normal.      Breath sounds: Normal breath sounds.   Skin:     General: Skin is warm and dry.   Neurological:      Mental Status: He is alert and oriented to  person, place, and time.   Psychiatric:         Mood and Affect: Mood normal.         Behavior: Behavior normal.         Thought Content: Thought content normal.         Judgment: Judgment normal.       CPAP Report    90/90 days of use  Greater than 4-hour use 98.9  90% pressure 15.3  AHI 1.3  Settings 14-20  The patient continues to use and benefit from CPAP therapy.    ASSESSMENT/PLAN    Diagnoses and all orders for this visit:    1. LUISANA (obstructive sleep apnea) (Primary)  -     PAP Therapy        Counseled patient regarding multimodal approach with healthy nutrition, healthy sleep, regular physical activity, social activities, counseling, and medications. Encouraged to practice lateral sleep position. Avoid alcohol and sedatives close to bedtime.    Refill supplies x1 year.  Return to clinic 1 year sooner symptoms warrant.    I have reviewed the results of my evaluation and impression and discussed my recommendations in detail with the patient.      Signed by  ANTHONY Morris    August 16, 2023      CC: Codey Josue PA         No ref. provider found

## 2023-09-25 DIAGNOSIS — E03.9 HYPOTHYROIDISM, UNSPECIFIED TYPE: ICD-10-CM

## 2023-09-26 RX ORDER — LEVOTHYROXINE SODIUM 0.15 MG/1
TABLET ORAL
Qty: 90 TABLET | Refills: 3 | Status: SHIPPED | OUTPATIENT
Start: 2023-09-26

## 2023-12-19 DIAGNOSIS — E11.9 TYPE 2 DIABETES MELLITUS WITHOUT COMPLICATION, WITHOUT LONG-TERM CURRENT USE OF INSULIN: Primary | ICD-10-CM

## 2024-02-14 RX ORDER — ISOPROPYL ALCOHOL 70 ML/100ML
SWAB TOPICAL
Qty: 300 EACH | Refills: 3 | Status: SHIPPED | OUTPATIENT
Start: 2024-02-14

## 2024-07-10 DIAGNOSIS — I10 PRIMARY HYPERTENSION: ICD-10-CM

## 2024-07-10 DIAGNOSIS — E11.9 TYPE 2 DIABETES MELLITUS WITHOUT COMPLICATION, WITHOUT LONG-TERM CURRENT USE OF INSULIN: ICD-10-CM

## 2024-07-10 RX ORDER — LISINOPRIL 10 MG/1
10 TABLET ORAL DAILY
Qty: 90 TABLET | Refills: 1 | Status: SHIPPED | OUTPATIENT
Start: 2024-07-10

## 2024-07-10 RX ORDER — SIMVASTATIN 10 MG
10 TABLET ORAL DAILY
Qty: 90 TABLET | Refills: 1 | Status: SHIPPED | OUTPATIENT
Start: 2024-07-10

## 2024-07-17 DIAGNOSIS — I10 ESSENTIAL HYPERTENSION: ICD-10-CM

## 2024-08-15 ENCOUNTER — OFFICE VISIT (OUTPATIENT)
Dept: SLEEP MEDICINE | Facility: CLINIC | Age: 64
End: 2024-08-15
Payer: MEDICARE

## 2024-08-15 VITALS
BODY MASS INDEX: 43.47 KG/M2 | SYSTOLIC BLOOD PRESSURE: 132 MMHG | HEART RATE: 81 BPM | TEMPERATURE: 98.4 F | WEIGHT: 277 LBS | HEIGHT: 67 IN | OXYGEN SATURATION: 96 % | DIASTOLIC BLOOD PRESSURE: 78 MMHG

## 2024-08-15 DIAGNOSIS — G47.33 OSA (OBSTRUCTIVE SLEEP APNEA): Primary | ICD-10-CM

## 2024-08-15 PROCEDURE — 3078F DIAST BP <80 MM HG: CPT | Performed by: NURSE PRACTITIONER

## 2024-08-15 PROCEDURE — 99213 OFFICE O/P EST LOW 20 MIN: CPT | Performed by: NURSE PRACTITIONER

## 2024-08-15 PROCEDURE — 3075F SYST BP GE 130 - 139MM HG: CPT | Performed by: NURSE PRACTITIONER

## 2024-08-15 NOTE — PROGRESS NOTES
Chief Complaint:   Chief Complaint   Patient presents with    Follow-up    Sleep Apnea       HPI:    Chandler Mar is a 64 y.o. male here for follow-up of sleep apnea.  Patient was last seen 8/16/2023.  Patient continues to do well with CPAP therapy.  Patient is sleeping anywhere from 6 to 9 hours nightly and feels rested upon awakening.  Patient goes to sleep easily and rarely gets up in the night.  Patient has an Florence score of 5/24.  Patient does well with heated tubing and fullface mask.  Patient has no concerns or complaints and will continue therapy.        Current medications are:   Current Outpatient Medications:     Accu-Chek Stacy Plus test strip, TEST BLOOD SUGAR THREE TIMES DAILY, Disp: 300 each, Rfl: 3    Accu-Chek Softclix Lancets lancets, USE TO TEST BLOOD SUGAR THREE TIMES DAILY, Disp: 300 each, Rfl: 11    Accu-Chek Softclix Lancets lancets, USE TO TEST BLOOD SUGAR THREE TIMES DAILY, Disp: 300 each, Rfl: 11    Alcohol Swabs (B-D SINGLE USE SWABS REGULAR) pads, USE TO CLEAN FINGER PRIOR TO CHECKING BLOOD SUGAR THREE TIMES DAILY, Disp: 300 each, Rfl: 11    Alcohol Swabs (DropSafe Alcohol Prep) 70 % pads, USE TO CLEAN FINGER PRIOR TO CHECKING BLOOD SUGAR THREE TIMES DAILY, Disp: 300 each, Rfl: 3    APPLE CIDER VINEGAR PO, Take  by mouth., Disp: , Rfl:     B Complex Vitamins (VITAMIN B COMPLEX PO), Take  by mouth., Disp: , Rfl:     Blood Glucose Calibration (Glucose Control) solution, Use to calibrate testing supplies. Dx E11.65, Disp: 1 each, Rfl: 11    Cholecalciferol (Vitamin D-3) 25 MCG (1000 UT) capsule, Take  by mouth., Disp: , Rfl:     glimepiride (AMARYL) 4 MG tablet, TAKE 1 TABLET IN THE MORNING BEFORE BREAKFAST, Disp: 90 tablet, Rfl: 3    glucose blood (Accu-Chek Stacy Plus) test strip, TEST BLOOD SUGAR THREE TIMES DAILY, Disp: 300 each, Rfl: 11    glucose monitor monitoring kit, Use to check blood sugar three times daily. Dx E11.9, Disp: 1 each, Rfl: 0    latanoprost (XALATAN) 0.005 %  ophthalmic solution, , Disp: , Rfl:     levothyroxine (SYNTHROID, LEVOTHROID) 150 MCG tablet, TAKE 1 TABLET EVERY DAY, Disp: 90 tablet, Rfl: 3    lisinopril (PRINIVIL,ZESTRIL) 10 MG tablet, Take 1 tablet by mouth Daily., Disp: 90 tablet, Rfl: 1    Magnesium 400 MG capsule, Take  by mouth., Disp: , Rfl:     metFORMIN (GLUCOPHAGE) 1000 MG tablet, TAKE 1 TABLET TWICE DAILY  WITH  MEALS, Disp: 180 tablet, Rfl: 3    metoprolol tartrate (LOPRESSOR) 25 MG tablet, TAKE 1 TABLET TWICE DAILY. MAY BREAK IN HALF IF NEEDED., Disp: 180 tablet, Rfl: 0    Nutritional Supplements (VITAMIN D BOOSTER PO), Take  by mouth., Disp: , Rfl:     simvastatin (ZOCOR) 10 MG tablet, Take 1 tablet by mouth Daily., Disp: 90 tablet, Rfl: 1    timolol (TIMOPTIC) 0.5 % ophthalmic solution, , Disp: , Rfl:     trimethoprim-polymyxin b (POLYTRIM) 00592-6.1 UNIT/ML-% ophthalmic solution, , Disp: , Rfl:     Turmeric 500 MG capsule, Take  by mouth., Disp: , Rfl: .      The patient's relevant past medical, surgical, family and social history were reviewed and updated in Epic as appropriate.       Review of Systems   HENT:  Positive for hearing loss.    Eyes:  Positive for visual disturbance.   Respiratory:  Positive for apnea.    Gastrointestinal:  Positive for constipation and diarrhea.   Musculoskeletal:  Positive for arthralgias, gait problem and joint swelling.   Allergic/Immunologic: Positive for environmental allergies.   Neurological:  Positive for numbness.   Psychiatric/Behavioral:  Positive for sleep disturbance.    All other systems reviewed and are negative.        Objective:    Physical Exam  Constitutional:       Appearance: Normal appearance.   HENT:      Head: Normocephalic and atraumatic.      Mouth/Throat:      Comments: Class 2 airway  Cardiovascular:      Rate and Rhythm: Normal rate and regular rhythm.   Pulmonary:      Effort: Pulmonary effort is normal.      Breath sounds: Normal breath sounds.   Skin:     General: Skin is warm and  dry.   Neurological:      Mental Status: He is alert and oriented to person, place, and time.   Psychiatric:         Mood and Affect: Mood normal.         Behavior: Behavior normal.         Thought Content: Thought content normal.         Judgment: Judgment normal.         CPAP Report  90/90 days of use  Greater than 4-hour use 100%  90% pressure 15.4  AHI 1.2  Setting 14-20    The patient continues to use and benefit from CPAP therapy.    ASSESSMENT/PLAN    Diagnoses and all orders for this visit:    1. LUISANA (obstructive sleep apnea) (Primary)  -     PAP Therapy        Counseled patient regarding multimodal approach with healthy nutrition, healthy sleep, regular physical activity, social activities, counseling, and medications. Encouraged to practice lateral sleep position. Avoid alcohol and sedatives close to bedtime.  Refill supplies x 1 year.  Return to clinic 1 year or sooner if symptoms warrant.    Signed by  ANTHONY Morris    August 15, 2024      CC: Codey Josue PA         No ref. provider found

## 2024-09-04 DIAGNOSIS — E11.9 TYPE 2 DIABETES MELLITUS WITHOUT COMPLICATION, WITHOUT LONG-TERM CURRENT USE OF INSULIN: ICD-10-CM

## 2024-09-04 RX ORDER — GLIMEPIRIDE 4 MG/1
TABLET ORAL
Qty: 90 TABLET | Refills: 3 | OUTPATIENT
Start: 2024-09-04

## 2024-09-28 DIAGNOSIS — I10 ESSENTIAL HYPERTENSION: ICD-10-CM

## 2024-09-30 RX ORDER — METOPROLOL TARTRATE 25 MG/1
TABLET, FILM COATED ORAL
Qty: 180 TABLET | Refills: 3 | OUTPATIENT
Start: 2024-09-30

## 2024-12-16 RX ORDER — ISOPROPYL ALCOHOL 70 ML/100ML
SWAB TOPICAL
Qty: 300 EACH | Refills: 3 | Status: SHIPPED | OUTPATIENT
Start: 2024-12-16

## 2025-01-30 DIAGNOSIS — E11.9 TYPE 2 DIABETES MELLITUS WITHOUT COMPLICATION, WITHOUT LONG-TERM CURRENT USE OF INSULIN: ICD-10-CM

## 2025-02-03 RX ORDER — SIMVASTATIN 10 MG
10 TABLET ORAL DAILY
Qty: 90 TABLET | Refills: 3 | Status: SHIPPED | OUTPATIENT
Start: 2025-02-03

## 2025-06-30 RX ORDER — SODIUM, POTASSIUM,MAG SULFATES 17.5-3.13G
1 SOLUTION, RECONSTITUTED, ORAL ORAL TAKE AS DIRECTED
Qty: 354 ML | Refills: 0 | Status: SHIPPED | OUTPATIENT
Start: 2025-06-30

## 2025-07-01 ENCOUNTER — TELEPHONE (OUTPATIENT)
Dept: GASTROENTEROLOGY | Facility: CLINIC | Age: 65
End: 2025-07-01
Payer: MEDICARE

## 2025-07-01 NOTE — TELEPHONE ENCOUNTER
I SPOKE WITH PATIENT. METFORMIN AND GLIMEPIRIDE WILL BE HELD SINCE HE'S ONLY GOING TO BE ON A LIQUID DIET. PATIENT VOICED UNDERSTANDING.

## 2025-07-08 ENCOUNTER — OUTSIDE FACILITY SERVICE (OUTPATIENT)
Dept: GASTROENTEROLOGY | Facility: CLINIC | Age: 65
End: 2025-07-08
Payer: MEDICARE

## 2025-07-08 PROCEDURE — 45385 COLONOSCOPY W/LESION REMOVAL: CPT | Performed by: INTERNAL MEDICINE

## 2025-07-08 PROCEDURE — 88305 TISSUE EXAM BY PATHOLOGIST: CPT | Performed by: INTERNAL MEDICINE

## 2025-07-09 ENCOUNTER — LAB REQUISITION (OUTPATIENT)
Dept: LAB | Facility: HOSPITAL | Age: 65
End: 2025-07-09
Payer: MEDICARE

## 2025-07-09 DIAGNOSIS — Z83.719 FAMILY HISTORY OF COLON POLYPS, UNSPECIFIED: ICD-10-CM

## 2025-07-09 DIAGNOSIS — Z12.11 ENCOUNTER FOR SCREENING FOR MALIGNANT NEOPLASM OF COLON: ICD-10-CM

## 2025-07-09 DIAGNOSIS — Z86.0100 PERSONAL HISTORY OF COLON POLYPS, UNSPECIFIED: ICD-10-CM

## 2025-07-09 DIAGNOSIS — K64.8 OTHER HEMORRHOIDS: ICD-10-CM

## 2025-07-09 DIAGNOSIS — D12.8 BENIGN NEOPLASM OF RECTUM: ICD-10-CM

## 2025-07-09 DIAGNOSIS — K57.30 DIVERTICULOSIS OF LARGE INTESTINE WITHOUT PERFORATION OR ABSCESS WITHOUT BLEEDING: ICD-10-CM

## 2025-07-09 DIAGNOSIS — D12.3 BENIGN NEOPLASM OF TRANSVERSE COLON: ICD-10-CM

## 2025-08-15 ENCOUNTER — OFFICE VISIT (OUTPATIENT)
Dept: SLEEP MEDICINE | Facility: CLINIC | Age: 65
End: 2025-08-15
Payer: MEDICARE

## 2025-08-15 VITALS
WEIGHT: 277 LBS | TEMPERATURE: 97.8 F | OXYGEN SATURATION: 96 % | DIASTOLIC BLOOD PRESSURE: 72 MMHG | HEIGHT: 67 IN | HEART RATE: 77 BPM | BODY MASS INDEX: 43.47 KG/M2 | SYSTOLIC BLOOD PRESSURE: 124 MMHG

## 2025-08-15 DIAGNOSIS — G47.33 OSA (OBSTRUCTIVE SLEEP APNEA): Primary | ICD-10-CM

## 2025-08-15 PROCEDURE — 3078F DIAST BP <80 MM HG: CPT | Performed by: NURSE PRACTITIONER

## 2025-08-15 PROCEDURE — 99213 OFFICE O/P EST LOW 20 MIN: CPT | Performed by: NURSE PRACTITIONER

## 2025-08-15 PROCEDURE — 3074F SYST BP LT 130 MM HG: CPT | Performed by: NURSE PRACTITIONER
